# Patient Record
Sex: FEMALE | Race: ASIAN | NOT HISPANIC OR LATINO | Employment: FULL TIME | URBAN - NONMETROPOLITAN AREA
[De-identification: names, ages, dates, MRNs, and addresses within clinical notes are randomized per-mention and may not be internally consistent; named-entity substitution may affect disease eponyms.]

---

## 2017-10-20 ENCOUNTER — TRANSCRIBE ORDERS (OUTPATIENT)
Dept: ADMINISTRATIVE | Facility: HOSPITAL | Age: 27
End: 2017-10-20

## 2017-10-20 DIAGNOSIS — N80.9 ENDOMETRIOSIS: Primary | ICD-10-CM

## 2017-10-25 ENCOUNTER — HOSPITAL ENCOUNTER (OUTPATIENT)
Dept: GENERAL RADIOLOGY | Facility: HOSPITAL | Age: 27
Discharge: HOME | End: 2017-10-25
Attending: OBSTETRICS & GYNECOLOGY | Admitting: OBSTETRICS & GYNECOLOGY

## 2017-10-25 DIAGNOSIS — N80.9 ENDOMETRIOSIS: ICD-10-CM

## 2017-10-25 RX ADMIN — IOPAMIDOL 15 ML: 612 INJECTION, SOLUTION INTRATHECAL at 10:00

## 2017-11-28 LAB
EXTERNAL HEPATITIS B SURFACE ANTIGEN: NEGATIVE
EXTERNAL RUBELLA QUALITATIVE: NORMAL
EXTERNAL SYPHILIS RPR SCREEN: NORMAL
HIV1 P24 AG SERPL QL IA: NORMAL

## 2018-04-16 PROBLEM — D50.9 IRON DEFICIENCY ANEMIA: Status: ACTIVE | Noted: 2018-04-16

## 2018-04-16 RX ORDER — SODIUM CHLORIDE 9 MG/ML
250 INJECTION, SOLUTION INTRAVENOUS ONCE
Status: CANCELLED | OUTPATIENT
Start: 2018-04-20

## 2018-04-20 ENCOUNTER — INFUSION (OUTPATIENT)
Dept: ONCOLOGY | Facility: HOSPITAL | Age: 28
End: 2018-04-20

## 2018-04-20 VITALS
SYSTOLIC BLOOD PRESSURE: 107 MMHG | TEMPERATURE: 98.3 F | HEART RATE: 95 BPM | OXYGEN SATURATION: 100 % | DIASTOLIC BLOOD PRESSURE: 55 MMHG | RESPIRATION RATE: 18 BRPM | WEIGHT: 146.4 LBS

## 2018-04-20 DIAGNOSIS — D50.9 IRON DEFICIENCY ANEMIA, UNSPECIFIED IRON DEFICIENCY ANEMIA TYPE: Primary | ICD-10-CM

## 2018-04-20 PROCEDURE — 96365 THER/PROPH/DIAG IV INF INIT: CPT

## 2018-04-20 PROCEDURE — 25010000002 IRON SUCROSE PER 1 MG: Performed by: OBSTETRICS & GYNECOLOGY

## 2018-04-20 RX ORDER — SODIUM CHLORIDE 9 MG/ML
250 INJECTION, SOLUTION INTRAVENOUS ONCE
Status: COMPLETED | OUTPATIENT
Start: 2018-04-20 | End: 2018-04-20

## 2018-04-20 RX ORDER — SODIUM CHLORIDE 9 MG/ML
250 INJECTION, SOLUTION INTRAVENOUS ONCE
Status: CANCELLED | OUTPATIENT
Start: 2018-04-20

## 2018-04-20 RX ADMIN — IRON SUCROSE 200 MG: 20 INJECTION, SOLUTION INTRAVENOUS at 13:37

## 2018-04-20 RX ADMIN — SODIUM CHLORIDE 250 ML: 9 INJECTION, SOLUTION INTRAVENOUS at 13:37

## 2018-04-27 ENCOUNTER — INFUSION (OUTPATIENT)
Dept: ONCOLOGY | Facility: HOSPITAL | Age: 28
End: 2018-04-27

## 2018-04-27 VITALS
DIASTOLIC BLOOD PRESSURE: 56 MMHG | TEMPERATURE: 97.8 F | SYSTOLIC BLOOD PRESSURE: 98 MMHG | BODY MASS INDEX: 26.5 KG/M2 | HEART RATE: 92 BPM | OXYGEN SATURATION: 98 % | WEIGHT: 144 LBS | RESPIRATION RATE: 18 BRPM | HEIGHT: 62 IN

## 2018-04-27 DIAGNOSIS — D50.9 IRON DEFICIENCY ANEMIA, UNSPECIFIED IRON DEFICIENCY ANEMIA TYPE: Primary | ICD-10-CM

## 2018-04-27 PROCEDURE — G0463 HOSPITAL OUTPT CLINIC VISIT: HCPCS

## 2018-04-27 RX ORDER — DOCUSATE SODIUM 100 MG/1
100 CAPSULE, LIQUID FILLED ORAL 2 TIMES DAILY
COMMUNITY
End: 2021-12-06

## 2018-04-27 RX ORDER — FERROUS SULFATE 325(65) MG
325 TABLET ORAL
COMMUNITY
End: 2021-03-15

## 2018-04-27 RX ORDER — SODIUM CHLORIDE 9 MG/ML
250 INJECTION, SOLUTION INTRAVENOUS ONCE
Status: CANCELLED | OUTPATIENT
Start: 2018-04-27

## 2018-04-27 RX ORDER — SODIUM CHLORIDE 9 MG/ML
250 INJECTION, SOLUTION INTRAVENOUS ONCE
Status: COMPLETED | OUTPATIENT
Start: 2018-04-27 | End: 2018-04-27

## 2018-04-27 RX ADMIN — SODIUM CHLORIDE 250 ML: 9 INJECTION, SOLUTION INTRAVENOUS at 14:55

## 2018-04-27 NOTE — PROGRESS NOTES
0880 Patient c/o having a headache after last weeks treatment but had the headache before the treatment. Also, felt like ran a temp about 2-2 1/2 hours after treatment last week, did not check temperature, chilled for a while then felt fine. B/P 97/54 on machine and 98/56 manually. Feels fine, denies problems at present. Called and s/w Dr. Cyndie Daley's nurse. Will discuss with her and call back with further orders.Jenna DELGADO    5041 Thuy DELGADO with Dr. Age[facility] to call pt with appointment next week. Patient aware, voiced understanding.Jenna DELGADO

## 2018-05-11 ENCOUNTER — INFUSION (OUTPATIENT)
Dept: ONCOLOGY | Facility: HOSPITAL | Age: 28
End: 2018-05-11

## 2018-05-11 VITALS
BODY MASS INDEX: 27.86 KG/M2 | TEMPERATURE: 98 F | WEIGHT: 151.4 LBS | HEART RATE: 99 BPM | SYSTOLIC BLOOD PRESSURE: 99 MMHG | DIASTOLIC BLOOD PRESSURE: 56 MMHG | OXYGEN SATURATION: 100 % | HEIGHT: 62 IN | RESPIRATION RATE: 18 BRPM

## 2018-05-11 DIAGNOSIS — D50.9 IRON DEFICIENCY ANEMIA, UNSPECIFIED IRON DEFICIENCY ANEMIA TYPE: Primary | ICD-10-CM

## 2018-05-11 PROCEDURE — 25010000002 IRON SUCROSE PER 1 MG: Performed by: OBSTETRICS & GYNECOLOGY

## 2018-05-11 PROCEDURE — 96365 THER/PROPH/DIAG IV INF INIT: CPT

## 2018-05-11 RX ORDER — SODIUM CHLORIDE 9 MG/ML
250 INJECTION, SOLUTION INTRAVENOUS ONCE
Status: COMPLETED | OUTPATIENT
Start: 2018-05-11 | End: 2018-05-11

## 2018-05-11 RX ORDER — SODIUM CHLORIDE 9 MG/ML
250 INJECTION, SOLUTION INTRAVENOUS ONCE
Status: CANCELLED | OUTPATIENT
Start: 2018-05-11

## 2018-05-11 RX ADMIN — IRON SUCROSE 200 MG: 20 INJECTION, SOLUTION INTRAVENOUS at 14:20

## 2018-05-11 RX ADMIN — SODIUM CHLORIDE 250 ML: 9 INJECTION, SOLUTION INTRAVENOUS at 14:20

## 2018-05-18 ENCOUNTER — APPOINTMENT (OUTPATIENT)
Dept: ONCOLOGY | Facility: HOSPITAL | Age: 28
End: 2018-05-18

## 2018-05-22 ENCOUNTER — INFUSION (OUTPATIENT)
Dept: ONCOLOGY | Facility: HOSPITAL | Age: 28
End: 2018-05-22

## 2018-05-22 ENCOUNTER — APPOINTMENT (OUTPATIENT)
Dept: ONCOLOGY | Facility: HOSPITAL | Age: 28
End: 2018-05-22

## 2018-05-22 VITALS
RESPIRATION RATE: 20 BRPM | DIASTOLIC BLOOD PRESSURE: 57 MMHG | TEMPERATURE: 98.2 F | HEART RATE: 89 BPM | OXYGEN SATURATION: 99 % | HEIGHT: 62 IN | WEIGHT: 151 LBS | BODY MASS INDEX: 27.79 KG/M2 | SYSTOLIC BLOOD PRESSURE: 97 MMHG

## 2018-05-22 DIAGNOSIS — D50.9 IRON DEFICIENCY ANEMIA, UNSPECIFIED IRON DEFICIENCY ANEMIA TYPE: Primary | ICD-10-CM

## 2018-05-22 PROCEDURE — 96365 THER/PROPH/DIAG IV INF INIT: CPT

## 2018-05-22 PROCEDURE — 25010000002 IRON SUCROSE PER 1 MG: Performed by: OBSTETRICS & GYNECOLOGY

## 2018-05-22 RX ORDER — SODIUM CHLORIDE 9 MG/ML
250 INJECTION, SOLUTION INTRAVENOUS ONCE
Status: CANCELLED | OUTPATIENT
Start: 2018-05-22

## 2018-05-22 RX ORDER — SODIUM CHLORIDE 9 MG/ML
250 INJECTION, SOLUTION INTRAVENOUS ONCE
Status: COMPLETED | OUTPATIENT
Start: 2018-05-22 | End: 2018-05-22

## 2018-05-22 RX ADMIN — SODIUM CHLORIDE 250 ML: 9 INJECTION, SOLUTION INTRAVENOUS at 14:52

## 2018-05-22 RX ADMIN — IRON SUCROSE 200 MG: 20 INJECTION, SOLUTION INTRAVENOUS at 14:52

## 2018-05-31 ENCOUNTER — INFUSION (OUTPATIENT)
Dept: ONCOLOGY | Facility: HOSPITAL | Age: 28
End: 2018-05-31

## 2018-05-31 VITALS
WEIGHT: 156.6 LBS | HEIGHT: 62 IN | BODY MASS INDEX: 28.82 KG/M2 | DIASTOLIC BLOOD PRESSURE: 56 MMHG | TEMPERATURE: 97.8 F | RESPIRATION RATE: 20 BRPM | OXYGEN SATURATION: 100 % | SYSTOLIC BLOOD PRESSURE: 95 MMHG | HEART RATE: 84 BPM

## 2018-05-31 DIAGNOSIS — D50.9 IRON DEFICIENCY ANEMIA, UNSPECIFIED IRON DEFICIENCY ANEMIA TYPE: Primary | ICD-10-CM

## 2018-05-31 PROCEDURE — 25010000002 IRON SUCROSE PER 1 MG: Performed by: OBSTETRICS & GYNECOLOGY

## 2018-05-31 PROCEDURE — 96365 THER/PROPH/DIAG IV INF INIT: CPT

## 2018-05-31 RX ORDER — SODIUM CHLORIDE 9 MG/ML
250 INJECTION, SOLUTION INTRAVENOUS ONCE
Status: CANCELLED | OUTPATIENT
Start: 2018-05-31

## 2018-05-31 RX ORDER — SODIUM CHLORIDE 9 MG/ML
250 INJECTION, SOLUTION INTRAVENOUS ONCE
Status: COMPLETED | OUTPATIENT
Start: 2018-05-31 | End: 2018-05-31

## 2018-05-31 RX ADMIN — IRON SUCROSE 200 MG: 20 INJECTION, SOLUTION INTRAVENOUS at 15:13

## 2018-05-31 RX ADMIN — SODIUM CHLORIDE 250 ML: 9 INJECTION, SOLUTION INTRAVENOUS at 15:12

## 2018-07-03 LAB — EXTERNAL GROUP B STREP ANTIGEN: NEGATIVE

## 2018-07-31 ENCOUNTER — HOSPITAL ENCOUNTER (INPATIENT)
Facility: HOSPITAL | Age: 28
LOS: 2 days | Discharge: HOME | End: 2018-08-02
Attending: OBSTETRICS & GYNECOLOGY | Admitting: OBSTETRICS & GYNECOLOGY

## 2018-07-31 ENCOUNTER — ANESTHESIA EVENT (OUTPATIENT)
Dept: LABOR AND DELIVERY | Facility: HOSPITAL | Age: 28
End: 2018-07-31

## 2018-07-31 ENCOUNTER — ANESTHESIA (OUTPATIENT)
Dept: LABOR AND DELIVERY | Facility: HOSPITAL | Age: 28
End: 2018-07-31

## 2018-07-31 DIAGNOSIS — D50.9 IRON DEFICIENCY ANEMIA, UNSPECIFIED IRON DEFICIENCY ANEMIA TYPE: Primary | ICD-10-CM

## 2018-07-31 DIAGNOSIS — Z37.9 NORMAL LABOR: ICD-10-CM

## 2018-07-31 DIAGNOSIS — Z34.93 THIRD TRIMESTER PREGNANCY: ICD-10-CM

## 2018-07-31 LAB
ABO GROUP BLD: NORMAL
BLD GP AB SCN SERPL QL: NEGATIVE
DEPRECATED RDW RBC AUTO: 46.5 FL (ref 40–54)
ERYTHROCYTE [DISTWIDTH] IN BLOOD BY AUTOMATED COUNT: 14.6 % (ref 12–15)
GIANT PLATELETS: ABNORMAL
HCT VFR BLD AUTO: 37.2 % (ref 37–47)
HGB BLD-MCNC: 12.3 G/DL (ref 12–16)
LYMPHOCYTES # BLD MANUAL: 1.64 10*3/MM3 (ref 0.72–4.86)
LYMPHOCYTES NFR BLD MANUAL: 13.3 % (ref 15–45)
LYMPHOCYTES NFR BLD MANUAL: 7.1 % (ref 4–12)
MCH RBC QN AUTO: 29.3 PG (ref 28–32)
MCHC RBC AUTO-ENTMCNC: 33.1 G/DL (ref 33–36)
MCV RBC AUTO: 88.6 FL (ref 82–98)
MONOCYTES # BLD AUTO: 0.88 10*3/MM3 (ref 0.19–1.3)
NEUTROPHILS # BLD AUTO: 9.44 10*3/MM3 (ref 1.87–8.4)
NEUTROPHILS NFR BLD MANUAL: 76.5 % (ref 39–78)
PLATELET # BLD AUTO: 185 10*3/MM3 (ref 130–400)
PMV BLD AUTO: 11.2 FL (ref 6–12)
RBC # BLD AUTO: 4.2 10*6/MM3 (ref 4.2–5.4)
RBC MORPH BLD: NORMAL
RH BLD: POSITIVE
T&S EXPIRATION DATE: NORMAL
VARIANT LYMPHS NFR BLD MANUAL: 3.1 % (ref 0–5)
WBC MORPH BLD: NORMAL
WBC NRBC COR # BLD: 12.34 10*3/MM3 (ref 4.8–10.8)

## 2018-07-31 PROCEDURE — 86901 BLOOD TYPING SEROLOGIC RH(D): CPT | Performed by: OBSTETRICS & GYNECOLOGY

## 2018-07-31 PROCEDURE — 86850 RBC ANTIBODY SCREEN: CPT | Performed by: OBSTETRICS & GYNECOLOGY

## 2018-07-31 PROCEDURE — 25010000002 BUTORPHANOL PER 1 MG: Performed by: OBSTETRICS & GYNECOLOGY

## 2018-07-31 PROCEDURE — 4A1HX4Z MONITORING OF PRODUCTS OF CONCEPTION, CARDIAC ELECTRICAL ACTIVITY, EXTERNAL APPROACH: ICD-10-PCS | Performed by: OBSTETRICS & GYNECOLOGY

## 2018-07-31 PROCEDURE — 25010000002 FENTANYL CITRATE (PF) 250 MCG/5ML SOLUTION: Performed by: NURSE ANESTHETIST, CERTIFIED REGISTERED

## 2018-07-31 PROCEDURE — 85007 BL SMEAR W/DIFF WBC COUNT: CPT | Performed by: OBSTETRICS & GYNECOLOGY

## 2018-07-31 PROCEDURE — C1755 CATHETER, INTRASPINAL: HCPCS | Performed by: NURSE ANESTHETIST, CERTIFIED REGISTERED

## 2018-07-31 PROCEDURE — 25010000002 KETOROLAC TROMETHAMINE PER 15 MG: Performed by: OBSTETRICS & GYNECOLOGY

## 2018-07-31 PROCEDURE — 85027 COMPLETE CBC AUTOMATED: CPT | Performed by: OBSTETRICS & GYNECOLOGY

## 2018-07-31 PROCEDURE — 0DQP0ZZ REPAIR RECTUM, OPEN APPROACH: ICD-10-PCS | Performed by: OBSTETRICS & GYNECOLOGY

## 2018-07-31 PROCEDURE — 86900 BLOOD TYPING SEROLOGIC ABO: CPT | Performed by: OBSTETRICS & GYNECOLOGY

## 2018-07-31 PROCEDURE — 25010000002 ROPIVACAINE PER 1 MG: Performed by: NURSE ANESTHETIST, CERTIFIED REGISTERED

## 2018-07-31 PROCEDURE — 51702 INSERT TEMP BLADDER CATH: CPT

## 2018-07-31 RX ORDER — DOCUSATE SODIUM 100 MG/1
100 CAPSULE, LIQUID FILLED ORAL 3 TIMES DAILY
Status: DISCONTINUED | OUTPATIENT
Start: 2018-07-31 | End: 2018-08-02 | Stop reason: HOSPADM

## 2018-07-31 RX ORDER — METHYLERGONOVINE MALEATE 0.2 MG/ML
200 INJECTION INTRAVENOUS ONCE
Status: DISCONTINUED | OUTPATIENT
Start: 2018-07-31 | End: 2018-08-01

## 2018-07-31 RX ORDER — ONDANSETRON 4 MG/1
4 TABLET, ORALLY DISINTEGRATING ORAL EVERY 6 HOURS PRN
Status: DISCONTINUED | OUTPATIENT
Start: 2018-07-31 | End: 2018-07-31

## 2018-07-31 RX ORDER — FENTANYL CITRATE 50 UG/ML
INJECTION, SOLUTION INTRAMUSCULAR; INTRAVENOUS AS NEEDED
Status: DISCONTINUED | OUTPATIENT
Start: 2018-07-31 | End: 2018-08-01 | Stop reason: SURG

## 2018-07-31 RX ORDER — LIDOCAINE HYDROCHLORIDE AND EPINEPHRINE 15; 5 MG/ML; UG/ML
INJECTION, SOLUTION EPIDURAL AS NEEDED
Status: DISCONTINUED | OUTPATIENT
Start: 2018-07-31 | End: 2018-08-01 | Stop reason: SURG

## 2018-07-31 RX ORDER — LIDOCAINE HYDROCHLORIDE 10 MG/ML
5 INJECTION, SOLUTION EPIDURAL; INFILTRATION; INTRACAUDAL; PERINEURAL AS NEEDED
Status: DISCONTINUED | OUTPATIENT
Start: 2018-07-31 | End: 2018-08-02 | Stop reason: HOSPADM

## 2018-07-31 RX ORDER — MISOPROSTOL 200 UG/1
800 TABLET ORAL AS NEEDED
Status: DISCONTINUED | OUTPATIENT
Start: 2018-07-31 | End: 2018-07-31

## 2018-07-31 RX ORDER — ONDANSETRON 2 MG/ML
4 INJECTION INTRAMUSCULAR; INTRAVENOUS EVERY 6 HOURS PRN
Status: DISCONTINUED | OUTPATIENT
Start: 2018-07-31 | End: 2018-08-02 | Stop reason: HOSPADM

## 2018-07-31 RX ORDER — OXYTOCIN/0.9 % SODIUM CHLORIDE 30/500 ML
2-30 PLASTIC BAG, INJECTION (ML) INTRAVENOUS
Status: DISCONTINUED | OUTPATIENT
Start: 2018-07-31 | End: 2018-07-31

## 2018-07-31 RX ORDER — BUTORPHANOL TARTRATE 1 MG/ML
1 INJECTION, SOLUTION INTRAMUSCULAR; INTRAVENOUS ONCE AS NEEDED
Status: COMPLETED | OUTPATIENT
Start: 2018-07-31 | End: 2018-07-31

## 2018-07-31 RX ORDER — CARBOPROST TROMETHAMINE 250 UG/ML
250 INJECTION, SOLUTION INTRAMUSCULAR AS NEEDED
Status: DISCONTINUED | OUTPATIENT
Start: 2018-07-31 | End: 2018-07-31 | Stop reason: HOSPADM

## 2018-07-31 RX ORDER — METHYLERGONOVINE MALEATE 0.2 MG/ML
200 INJECTION INTRAVENOUS ONCE AS NEEDED
Status: DISCONTINUED | OUTPATIENT
Start: 2018-07-31 | End: 2018-07-31 | Stop reason: HOSPADM

## 2018-07-31 RX ORDER — ROPIVACAINE HYDROCHLORIDE 5 MG/ML
INJECTION, SOLUTION EPIDURAL; INFILTRATION; PERINEURAL AS NEEDED
Status: DISCONTINUED | OUTPATIENT
Start: 2018-07-31 | End: 2018-08-01 | Stop reason: SURG

## 2018-07-31 RX ORDER — ONDANSETRON 4 MG/1
4 TABLET, FILM COATED ORAL EVERY 6 HOURS PRN
Status: DISCONTINUED | OUTPATIENT
Start: 2018-07-31 | End: 2018-07-31

## 2018-07-31 RX ORDER — ROPIVACAINE HYDROCHLORIDE 2 MG/ML
INJECTION, SOLUTION EPIDURAL; INFILTRATION; PERINEURAL AS NEEDED
Status: DISCONTINUED | OUTPATIENT
Start: 2018-07-31 | End: 2018-08-01 | Stop reason: SURG

## 2018-07-31 RX ORDER — LIDOCAINE HYDROCHLORIDE 15 MG/ML
INJECTION, SOLUTION EPIDURAL; INFILTRATION; INTRACAUDAL; PERINEURAL AS NEEDED
Status: DISCONTINUED | OUTPATIENT
Start: 2018-07-31 | End: 2018-08-01 | Stop reason: SURG

## 2018-07-31 RX ORDER — OXYTOCIN/RINGER'S LACTATE 20/1000 ML
999 PLASTIC BAG, INJECTION (ML) INTRAVENOUS ONCE
Status: DISCONTINUED | OUTPATIENT
Start: 2018-07-31 | End: 2018-07-31 | Stop reason: HOSPADM

## 2018-07-31 RX ORDER — ACETAMINOPHEN 325 MG/1
650 TABLET ORAL EVERY 4 HOURS PRN
Status: DISCONTINUED | OUTPATIENT
Start: 2018-07-31 | End: 2018-08-02 | Stop reason: HOSPADM

## 2018-07-31 RX ORDER — HETASTARCH 6 G/100ML
INJECTION, SOLUTION INTRAVENOUS
Status: COMPLETED
Start: 2018-07-31 | End: 2018-07-31

## 2018-07-31 RX ORDER — ONDANSETRON 2 MG/ML
4 INJECTION INTRAMUSCULAR; INTRAVENOUS EVERY 6 HOURS PRN
Status: DISCONTINUED | OUTPATIENT
Start: 2018-07-31 | End: 2018-07-31

## 2018-07-31 RX ORDER — SODIUM CHLORIDE 0.9 % (FLUSH) 0.9 %
1-10 SYRINGE (ML) INJECTION AS NEEDED
Status: DISCONTINUED | OUTPATIENT
Start: 2018-07-31 | End: 2018-08-02 | Stop reason: HOSPADM

## 2018-07-31 RX ORDER — SODIUM CHLORIDE, SODIUM LACTATE, POTASSIUM CHLORIDE, CALCIUM CHLORIDE 600; 310; 30; 20 MG/100ML; MG/100ML; MG/100ML; MG/100ML
999 INJECTION, SOLUTION INTRAVENOUS ONCE
Status: COMPLETED | OUTPATIENT
Start: 2018-07-31 | End: 2018-07-31

## 2018-07-31 RX ORDER — ONDANSETRON 4 MG/1
4 TABLET, FILM COATED ORAL EVERY 8 HOURS PRN
Status: DISCONTINUED | OUTPATIENT
Start: 2018-07-31 | End: 2018-08-02 | Stop reason: HOSPADM

## 2018-07-31 RX ORDER — KETOROLAC TROMETHAMINE 30 MG/ML
30 INJECTION, SOLUTION INTRAMUSCULAR; INTRAVENOUS ONCE
Status: COMPLETED | OUTPATIENT
Start: 2018-07-31 | End: 2018-07-31

## 2018-07-31 RX ORDER — OXYCODONE HYDROCHLORIDE AND ACETAMINOPHEN 5; 325 MG/1; MG/1
1 TABLET ORAL EVERY 4 HOURS PRN
Status: DISCONTINUED | OUTPATIENT
Start: 2018-07-31 | End: 2018-08-02 | Stop reason: HOSPADM

## 2018-07-31 RX ORDER — DEXTROSE, SODIUM CHLORIDE, SODIUM LACTATE, POTASSIUM CHLORIDE, AND CALCIUM CHLORIDE 5; .6; .31; .03; .02 G/100ML; G/100ML; G/100ML; G/100ML; G/100ML
999 INJECTION, SOLUTION INTRAVENOUS ONCE
Status: COMPLETED | OUTPATIENT
Start: 2018-07-31 | End: 2018-07-31

## 2018-07-31 RX ORDER — OXYTOCIN/RINGER'S LACTATE 20/1000 ML
125 PLASTIC BAG, INJECTION (ML) INTRAVENOUS AS NEEDED
Status: DISCONTINUED | OUTPATIENT
Start: 2018-07-31 | End: 2018-07-31 | Stop reason: HOSPADM

## 2018-07-31 RX ORDER — FERROUS SULFATE 325(65) MG
325 TABLET ORAL 2 TIMES DAILY WITH MEALS
Status: DISCONTINUED | OUTPATIENT
Start: 2018-07-31 | End: 2018-08-02 | Stop reason: HOSPADM

## 2018-07-31 RX ORDER — SODIUM CHLORIDE 0.9 % (FLUSH) 0.9 %
1-10 SYRINGE (ML) INJECTION AS NEEDED
Status: DISCONTINUED | OUTPATIENT
Start: 2018-07-31 | End: 2018-08-01

## 2018-07-31 RX ORDER — CARBOPROST TROMETHAMINE 250 UG/ML
250 INJECTION, SOLUTION INTRAMUSCULAR ONCE
Status: DISCONTINUED | OUTPATIENT
Start: 2018-07-31 | End: 2018-08-01

## 2018-07-31 RX ORDER — BUTORPHANOL TARTRATE 1 MG/ML
1 INJECTION, SOLUTION INTRAMUSCULAR; INTRAVENOUS
Status: DISCONTINUED | OUTPATIENT
Start: 2018-07-31 | End: 2018-07-31

## 2018-07-31 RX ORDER — SODIUM CHLORIDE, SODIUM LACTATE, POTASSIUM CHLORIDE, CALCIUM CHLORIDE 600; 310; 30; 20 MG/100ML; MG/100ML; MG/100ML; MG/100ML
125 INJECTION, SOLUTION INTRAVENOUS CONTINUOUS
Status: DISCONTINUED | OUTPATIENT
Start: 2018-07-31 | End: 2018-07-31

## 2018-07-31 RX ORDER — OXYCODONE AND ACETAMINOPHEN 10; 325 MG/1; MG/1
1 TABLET ORAL EVERY 4 HOURS PRN
Status: DISCONTINUED | OUTPATIENT
Start: 2018-07-31 | End: 2018-08-01

## 2018-07-31 RX ORDER — CALCIUM CARBONATE 200(500)MG
2 TABLET,CHEWABLE ORAL 3 TIMES DAILY PRN
Status: DISCONTINUED | OUTPATIENT
Start: 2018-07-31 | End: 2018-08-02 | Stop reason: HOSPADM

## 2018-07-31 RX ORDER — OXYTOCIN/RINGER'S LACTATE 20/1000 ML
999 PLASTIC BAG, INJECTION (ML) INTRAVENOUS CONTINUOUS
Status: DISCONTINUED | OUTPATIENT
Start: 2018-07-31 | End: 2018-08-01

## 2018-07-31 RX ORDER — HETASTARCH 6 G/100ML
500 INJECTION, SOLUTION INTRAVENOUS CONTINUOUS
Status: DISCONTINUED | OUTPATIENT
Start: 2018-07-31 | End: 2018-07-31

## 2018-07-31 RX ORDER — EPHEDRINE SULFATE 50 MG/ML
5 INJECTION, SOLUTION INTRAVENOUS
Status: DISCONTINUED | OUTPATIENT
Start: 2018-07-31 | End: 2018-07-31

## 2018-07-31 RX ADMIN — KETOROLAC TROMETHAMINE 30 MG: 30 INJECTION, SOLUTION INTRAMUSCULAR at 18:37

## 2018-07-31 RX ADMIN — SODIUM CHLORIDE, POTASSIUM CHLORIDE, SODIUM LACTATE AND CALCIUM CHLORIDE 125 ML/HR: 600; 310; 30; 20 INJECTION, SOLUTION INTRAVENOUS at 16:12

## 2018-07-31 RX ADMIN — ROPIVACAINE HYDROCHLORIDE 6 ML: 2 INJECTION, SOLUTION EPIDURAL; INFILTRATION at 12:52

## 2018-07-31 RX ADMIN — LIDOCAINE HYDROCHLORIDE 3 ML: 15 INJECTION, SOLUTION EPIDURAL; INFILTRATION; INTRACAUDAL; PERINEURAL at 12:50

## 2018-07-31 RX ADMIN — SODIUM CHLORIDE, POTASSIUM CHLORIDE, SODIUM LACTATE AND CALCIUM CHLORIDE 1000 ML: 600; 310; 30; 20 INJECTION, SOLUTION INTRAVENOUS at 07:31

## 2018-07-31 RX ADMIN — LIDOCAINE HYDROCHLORIDE AND EPINEPHRINE 3 ML: 15; 5 INJECTION, SOLUTION EPIDURAL at 12:53

## 2018-07-31 RX ADMIN — FENTANYL CITRATE 50 MCG: 50 INJECTION INTRAMUSCULAR; INTRAVENOUS at 12:52

## 2018-07-31 RX ADMIN — SODIUM CHLORIDE, SODIUM LACTATE, POTASSIUM CHLORIDE, CALCIUM CHLORIDE AND DEXTROSE MONOHYDRATE 999 ML/HR: 5; 600; 310; 30; 20 INJECTION, SOLUTION INTRAVENOUS at 15:37

## 2018-07-31 RX ADMIN — ROPIVACAINE HYDROCHLORIDE 6 ML: 5 INJECTION, SOLUTION EPIDURAL; INFILTRATION; PERINEURAL at 12:52

## 2018-07-31 RX ADMIN — FENTANYL CITRATE 200 MCG: 50 INJECTION INTRAMUSCULAR; INTRAVENOUS at 12:57

## 2018-07-31 RX ADMIN — OXYTOCIN 2 MILLI-UNITS/MIN: 10 INJECTION INTRAVENOUS at 11:14

## 2018-07-31 RX ADMIN — SODIUM CHLORIDE, POTASSIUM CHLORIDE, SODIUM LACTATE AND CALCIUM CHLORIDE 999 ML/HR: 600; 310; 30; 20 INJECTION, SOLUTION INTRAVENOUS at 21:50

## 2018-07-31 RX ADMIN — HETASTARCH 500 ML: 6 INJECTION, SOLUTION INTRAVENOUS at 08:09

## 2018-07-31 RX ADMIN — SODIUM CHLORIDE, POTASSIUM CHLORIDE, SODIUM LACTATE AND CALCIUM CHLORIDE 125 ML/HR: 600; 310; 30; 20 INJECTION, SOLUTION INTRAVENOUS at 11:16

## 2018-07-31 RX ADMIN — SODIUM CHLORIDE, POTASSIUM CHLORIDE, SODIUM LACTATE AND CALCIUM CHLORIDE 125 ML/HR: 600; 310; 30; 20 INJECTION, SOLUTION INTRAVENOUS at 06:29

## 2018-07-31 RX ADMIN — SODIUM CHLORIDE, POTASSIUM CHLORIDE, SODIUM LACTATE AND CALCIUM CHLORIDE 125 ML/HR: 600; 310; 30; 20 INJECTION, SOLUTION INTRAVENOUS at 15:09

## 2018-07-31 RX ADMIN — FERROUS SULFATE TAB 325 MG (65 MG ELEMENTAL FE) 325 MG: 325 (65 FE) TAB at 22:26

## 2018-07-31 RX ADMIN — ACETAMINOPHEN 650 MG: 325 TABLET, FILM COATED ORAL at 23:17

## 2018-07-31 RX ADMIN — ROPIVACAINE HYDROCHLORIDE 10 ML/HR: 2 INJECTION, SOLUTION EPIDURAL; INFILTRATION at 13:10

## 2018-07-31 RX ADMIN — BUTORPHANOL TARTRATE 1 MG: 1 INJECTION, SOLUTION INTRAMUSCULAR; INTRAVENOUS at 11:45

## 2018-08-01 LAB
HCT VFR BLD AUTO: 25.6 % (ref 37–47)
HGB BLD-MCNC: 8.5 G/DL (ref 12–16)

## 2018-08-01 PROCEDURE — 25010000002 IRON SUCROSE PER 1 MG: Performed by: OBSTETRICS & GYNECOLOGY

## 2018-08-01 PROCEDURE — 85014 HEMATOCRIT: CPT | Performed by: OBSTETRICS & GYNECOLOGY

## 2018-08-01 PROCEDURE — 85018 HEMOGLOBIN: CPT | Performed by: OBSTETRICS & GYNECOLOGY

## 2018-08-01 PROCEDURE — 25010000003 CEFAZOLIN PER 500 MG: Performed by: OBSTETRICS & GYNECOLOGY

## 2018-08-01 RX ORDER — IBUPROFEN 200 MG
600 TABLET ORAL EVERY 4 HOURS PRN
Status: DISCONTINUED | OUTPATIENT
Start: 2018-08-01 | End: 2018-08-02 | Stop reason: HOSPADM

## 2018-08-01 RX ADMIN — POLYETHYLENE GLYCOL (3350) 17 G: 17 POWDER, FOR SOLUTION ORAL at 07:56

## 2018-08-01 RX ADMIN — IBUPROFEN 600 MG: 200 TABLET, FILM COATED ORAL at 18:45

## 2018-08-01 RX ADMIN — FERROUS SULFATE TAB 325 MG (65 MG ELEMENTAL FE) 325 MG: 325 (65 FE) TAB at 07:56

## 2018-08-01 RX ADMIN — OXYCODONE HYDROCHLORIDE AND ACETAMINOPHEN 1 TABLET: 5; 325 TABLET ORAL at 18:03

## 2018-08-01 RX ADMIN — OXYCODONE HYDROCHLORIDE AND ACETAMINOPHEN 1 TABLET: 5; 325 TABLET ORAL at 09:20

## 2018-08-01 RX ADMIN — CEFAZOLIN 2 G: 330 INJECTION, POWDER, FOR SOLUTION INTRAMUSCULAR; INTRAVENOUS at 20:45

## 2018-08-01 RX ADMIN — DOCUSATE SODIUM 100 MG: 100 CAPSULE, LIQUID FILLED ORAL at 07:56

## 2018-08-01 RX ADMIN — IRON SUCROSE 200 MG: 20 INJECTION, SOLUTION INTRAVENOUS at 10:16

## 2018-08-01 NOTE — ANESTHESIA POSTPROCEDURE EVALUATION
"Patient: Yajaira Doss    Procedure Summary     Date:  07/31/18 Room / Location:      Anesthesia Start:  1247 Anesthesia Stop:  1530    Procedure:  LABOR ANALGESIA Diagnosis:      Scheduled Providers:   Provider:  Murphy Duff CRNA    Anesthesia Type:  epidural ASA Status:  1          Anesthesia Type: epidural  Last vitals  BP   96/60 (08/01/18 1201)   Temp   97.6 °F (36.4 °C) (08/01/18 1201)   Pulse   86 (08/01/18 1201)   Resp   16 (08/01/18 1201)     SpO2   97 % (08/01/18 1201)     Post Anesthesia Care and Evaluation    Patient location during evaluation: floor  Patient participation: complete - patient participated  Level of consciousness: awake  Pain score: 0  Pain management: adequate  Airway patency: patent  Anesthetic complications: No anesthetic complications  PONV Status: none  Cardiovascular status: acceptable  Respiratory status: acceptable  Hydration status: acceptable  Post Neuraxial Block status: Motor and sensory function returned to baseline  Comments: Pt complains of \"slight\" headache when getting up to bathroom or ambulating  Laying in bed with head 45 degrees now with no head ache  Explained treatment and options for spinal headache  She stated she will let things be for now  Instructed to call anesthesia if symptoms change  No anesthesia care post op    "

## 2018-08-01 NOTE — PLAN OF CARE
Problem: Patient Care Overview  Goal: Plan of Care Review  Outcome: Ongoing (interventions implemented as appropriate)   08/01/18 1701   Coping/Psychosocial   Plan of Care Reviewed With patient   Plan of Care Review   Progress improving   OTHER   Outcome Summary VSS, sitz bath done per pt, FF ML UU scant lochia, percocet given for pain x1 today. Pt ambulating without assistance.      Goal: Individualization and Mutuality  Outcome: Ongoing (interventions implemented as appropriate)    Goal: Discharge Needs Assessment  Outcome: Ongoing (interventions implemented as appropriate)    Goal: Interprofessional Rounds/Family Conf  Outcome: Ongoing (interventions implemented as appropriate)      Problem: Anesthesia/Analgesia, Neuraxial (Obstetrics)  Goal: Signs and Symptoms of Listed Potential Problems Will be Absent, Minimized or Managed (Anesthesia/Analgesia, Neuraxial)  Outcome: Outcome(s) achieved Date Met: 08/01/18      Problem: Postpartum (Vaginal Delivery) (Adult,Obstetrics,Pediatric)  Goal: Signs and Symptoms of Listed Potential Problems Will be Absent, Minimized or Managed (Postpartum)  Outcome: Ongoing (interventions implemented as appropriate)

## 2018-08-01 NOTE — PLAN OF CARE
Problem: Patient Care Overview  Goal: Plan of Care Review   07/31/18 2039   Coping/Psychosocial   Plan of Care Reviewed With patient;spouse     Goal: Individualization and Mutuality  Outcome: Ongoing (interventions implemented as appropriate)

## 2018-08-01 NOTE — PLAN OF CARE
Problem: Patient Care Overview  Goal: Plan of Care Review  Outcome: Ongoing (interventions implemented as appropriate)   08/01/18 0547   Coping/Psychosocial   Plan of Care Reviewed With patient   Plan of Care Review   Progress improving   OTHER   Outcome Summary VSS. Comfort products used for 4th degree laceration. FF ML UU scant lochia. Tylenol given for pain per pt request. Sitz bath given. Voiding.      Goal: Individualization and Mutuality  Outcome: Ongoing (interventions implemented as appropriate)    Goal: Discharge Needs Assessment  Outcome: Ongoing (interventions implemented as appropriate)    Goal: Interprofessional Rounds/Family Conf  Outcome: Ongoing (interventions implemented as appropriate)      Problem: Anesthesia/Analgesia, Neuraxial (Obstetrics)  Goal: Signs and Symptoms of Listed Potential Problems Will be Absent, Minimized or Managed (Anesthesia/Analgesia, Neuraxial)  Outcome: Ongoing (interventions implemented as appropriate)      Problem: Postpartum (Vaginal Delivery) (Adult,Obstetrics,Pediatric)  Goal: Signs and Symptoms of Listed Potential Problems Will be Absent, Minimized or Managed (Postpartum)  Outcome: Ongoing (interventions implemented as appropriate)

## 2018-08-01 NOTE — LACTATION NOTE
40/3 week female infant delivered vaginally 18 at 1723. Birth weight 6-7.7 (2940g). 5 stools and 5 breastfeeding sessions noted per charting since delivery. Infant is due to void. Mother reports infant  well after delivery. She states baby has been sleepy through the night, not wanting to wake to breastfeed. Mother pumped once, no drops collected. Assisted with waking techniques, proper positioning, and latching. Mother's nipple inverted with compression. Infant tongue thrusting and gagging, unable to latch despite attempts. Latch attempted with small nipple shield, however, infant continued to tongue thrust and gag. Unable to express drops of colostrum. Mother requests to feed formula at this time. Infant struggled with bottle feeding as well. Infant finally took about 10 ml of formula with MADALYN bottle. Mother pumped for 15 minutes, no drops collected. Education provided regarding pumping, flange size/proper fit, nipple shield, milk supply, formula feeding, adequate feedings, skin to skin, storage of breastmilk, and breast massage/hand expression. Gave and reviewed initial breastfeeding packet and book. Feeding plan discussed. Lactation's number placed on white board. Mother verbalized understanding. Questions denied.     Maternal Hx: , Anemia  Medications: PNV, FE, Vit B-12, Colace  Pump: Medela

## 2018-08-01 NOTE — NURSING NOTE
Notified of blood pressure drop of 8160 while patient was up to restroom.   In/out cath done with 600 ml of urine returned.  Fundal check is 1U,  scant rubra, uterus is firm without massage.  Patient feeling better and bp is 109/67.   Patient may transfer to2A at 2100 if blood pressure is still stable.  Crissy Simms RN

## 2018-08-01 NOTE — PROGRESS NOTES
"[facility]  Vaginal Delivery Progress Note    Subjective   Postpartum Day 1: Vaginal Delivery    The patient feels well.  Her pain is well controlled with nonsteroidal anti-inflammatory drugs.   She is ambulating well.  Patient describes her bleeding as thin lochia.        Objective     Vital Signs Range for the last 24 hours  Temperature: Temp:  [98.1 °F (36.7 °C)-98.8 °F (37.1 °C)] 98.5 °F (36.9 °C)   Temp Source: Temp src: Temporal Artery    BP: BP: ()/(44-95) 109/55   Pulse: Heart Rate:  [] 93   Respirations: Resp:  [14-18] 14   SPO2: SpO2:  [97 %-100 %] 98 %   O2 Amount (l/min):     O2 Devices Device (Oxygen Therapy): room air   Weight:       Admit Height:  Height: 157.5 cm (62\")      Physical Exam:  General:  no acute distresss.  Abdomen: abdomen is soft without significant tenderness, masses, organomegaly or guarding. Fundus: appropriate, firm, non tender  Extremities: normal, atraumatic, no cyanosis, and trace edema.   Perineum swollen, stitches intact    Lab results reviewed:  Yes     External Rubella Qual   Date Value Ref Range Status   2017 Immune  Final     Rh Status:    RH type   Date Value Ref Range Status   2018 Positive  Final     Assessment:  1.  PPD#1  fourth degree laceration  2.  Chronic anemia  3.  Large uterine fibroid    Plan:  1.  Supportive care ice/sitz bath/stool softener for laceration  2.  Chronic anemia- venofer/oral iron supplement  3.  Routine post partum care      Catie Costa DO  2018  8:38 AM  "

## 2018-08-02 VITALS
HEIGHT: 62 IN | BODY MASS INDEX: 30.66 KG/M2 | HEART RATE: 92 BPM | TEMPERATURE: 98.5 F | SYSTOLIC BLOOD PRESSURE: 110 MMHG | OXYGEN SATURATION: 97 % | DIASTOLIC BLOOD PRESSURE: 68 MMHG | RESPIRATION RATE: 16 BRPM | WEIGHT: 166.6 LBS

## 2018-08-02 RX ORDER — OXYCODONE HYDROCHLORIDE AND ACETAMINOPHEN 5; 325 MG/1; MG/1
1 TABLET ORAL EVERY 4 HOURS PRN
Qty: 30 TABLET | Refills: 0 | Status: SHIPPED | OUTPATIENT
Start: 2018-08-02 | End: 2018-08-10

## 2018-08-02 RX ORDER — ONDANSETRON 4 MG/1
4 TABLET, FILM COATED ORAL EVERY 8 HOURS PRN
Qty: 30 TABLET | Refills: 0 | Status: SHIPPED | OUTPATIENT
Start: 2018-08-02 | End: 2018-09-01

## 2018-08-02 RX ORDER — IBUPROFEN 600 MG/1
600 TABLET ORAL EVERY 6 HOURS PRN
Qty: 40 TABLET | Refills: 1 | Status: SHIPPED | OUTPATIENT
Start: 2018-08-02 | End: 2018-09-01

## 2018-08-02 RX ADMIN — POLYETHYLENE GLYCOL (3350) 17 G: 17 POWDER, FOR SOLUTION ORAL at 08:34

## 2018-08-02 RX ADMIN — OXYCODONE HYDROCHLORIDE AND ACETAMINOPHEN 1 TABLET: 5; 325 TABLET ORAL at 10:36

## 2018-08-02 RX ADMIN — DOCUSATE SODIUM 100 MG: 100 CAPSULE, LIQUID FILLED ORAL at 08:34

## 2018-08-02 RX ADMIN — OXYCODONE HYDROCHLORIDE AND ACETAMINOPHEN 1 TABLET: 5; 325 TABLET ORAL at 17:08

## 2018-08-02 NOTE — LACTATION NOTE
40 3/7 week infant delivered 18 @ 1723. Birth weight 6-2.3 (2787 g). Day 2 weight loss - 5%. 1 void, 5 stool in the past 24 hours. Mother has exclusively formula fed. Discussed direct breastfeeding vs pumping and supplementing. Mother wishes to pump and formula feed at this time. She states she will continue to attempt to breastfeed infant and pump. Offered outpatient lactation services. Reviewed milk production, transition of milk, milk supply, on demand feeding, pumping and supplementing, and offered support. Hand pump provided and instructed on use. Encouraged hands on pumping and hand expression, finger feeding infant every drop.     Maternal Hx: , Anemia  Prenatal Meds: PNV, FE, Vit B-12, Colace  Pump: Electric and manual

## 2018-08-02 NOTE — PLAN OF CARE
Problem: Patient Care Overview  Goal: Plan of Care Review  Outcome: Ongoing (interventions implemented as appropriate)   08/02/18 0421   Coping/Psychosocial   Plan of Care Reviewed With patient   Plan of Care Review   Progress improving   OTHER   Outcome Summary VSS. FF ML UU scant lochia. Ambulating in room. Comfort products being used.     Goal: Individualization and Mutuality  Outcome: Ongoing (interventions implemented as appropriate)    Goal: Discharge Needs Assessment  Outcome: Ongoing (interventions implemented as appropriate)    Goal: Interprofessional Rounds/Family Conf  Outcome: Ongoing (interventions implemented as appropriate)      Problem: Postpartum (Vaginal Delivery) (Adult,Obstetrics,Pediatric)  Goal: Signs and Symptoms of Listed Potential Problems Will be Absent, Minimized or Managed (Postpartum)  Outcome: Ongoing (interventions implemented as appropriate)

## 2018-08-03 NOTE — DISCHARGE SUMMARY
[facility]  Delivery Discharge Summary  Yajaira Doss    Primary OB Clinician: Julissa    EDC: Estimated Date of Delivery: 7/28/18    Gestational Age:40w3d    Antepartum complications: Chronic anemia                                                  Large uterine fibroid    Date of Delivery: 7/31/2018   Time of Delivery: 5:23 PM     Delivered By:  Catie Costa     Delivery Type: Vaginal, Spontaneous Delivery      Laceration: Yes  Laceration Information  Midline laceration with 4th degree extension  Please refer to delivery note for details of repair    Placenta: Spontaneous     Feeding method: Breastfeeding Status: Yes    Rh Immune globulin given: not applicable    Rubella vaccine given: not applicable    Discharge Date: 8/2/18 Discharge Time: 8:53 AM    Early Discharge:  NO    Plan:    Address and phone number verified and same.  Follow-up appointment with Julissa in 4 weeks

## 2020-11-17 LAB
EXTERNAL HEPATITIS B SURFACE ANTIGEN: NEGATIVE
EXTERNAL HERPES PCR: NORMAL
EXTERNAL RUBELLA QUALITATIVE: NORMAL
EXTERNAL SYPHILIS RPR SCREEN: NORMAL
HIV1 P24 AG SERPL QL IA: NORMAL

## 2020-12-16 ENCOUNTER — OFFICE VISIT (OUTPATIENT)
Age: 30
End: 2020-12-16

## 2020-12-16 ENCOUNTER — OFFICE VISIT (OUTPATIENT)
Dept: URGENT CARE | Age: 30
End: 2020-12-16
Payer: COMMERCIAL

## 2020-12-16 VITALS
WEIGHT: 129 LBS | HEART RATE: 84 BPM | DIASTOLIC BLOOD PRESSURE: 73 MMHG | HEIGHT: 62 IN | SYSTOLIC BLOOD PRESSURE: 112 MMHG | TEMPERATURE: 98.1 F | BODY MASS INDEX: 23.74 KG/M2 | OXYGEN SATURATION: 100 % | RESPIRATION RATE: 18 BRPM

## 2020-12-16 PROCEDURE — 99213 OFFICE O/P EST LOW 20 MIN: CPT | Performed by: NURSE PRACTITIONER

## 2020-12-16 ASSESSMENT — ENCOUNTER SYMPTOMS
CONSTIPATION: 0
SORE THROAT: 0
CHEST TIGHTNESS: 0
EYES NEGATIVE: 1
WHEEZING: 0
SINUS COMPLAINT: 1
VOMITING: 0
COUGH: 1
SHORTNESS OF BREATH: 0
NAUSEA: 0
DIARRHEA: 0

## 2020-12-16 NOTE — PROGRESS NOTES
[address] [facility]  [address] [place] [address] Sue Mitchell [id number]  Dept: [phone]  Dept Fax: [phone]  Loc: [phone]    Ed Cruz is a 27 y.o. female who presents today for her medical conditions/complaintsas noted below. Ed Cruz is c/o of Nasal Congestion (stuffy nose especially at night when she lies down. patient is also 12 weeks pregnant)        HPI:     Sinus Problem  This is a new problem. The current episode started yesterday. The problem has been gradually worsening since onset. There has been no fever. The fever has been present for 1 to 2 days. The pain is mild. Associated symptoms include congestion, coughing and headaches. Pertinent negatives include no ear pain, shortness of breath or sore throat. Treatments tried: Sudafed as directed per her OBGYN. The treatment provided no relief. History reviewed. No pertinent past medical history. History reviewed. No pertinent surgical history. History reviewed. No pertinent family history. Social History     Tobacco Use    Smoking status: Never Smoker    Smokeless tobacco: Never Used   Substance Use Topics    Alcohol use: Not Currently      Current Outpatient Medications   Medication Sig Dispense Refill    Prenatal Vit-Fe Fumarate-FA (PRENATAL VITAMINS PO) Take by mouth       No current facility-administered medications for this visit. No Known Allergies    Health Maintenance   Topic Date Due    Varicella vaccine (1 of 2 - 2-dose childhood series) 07/27/1991    HIV screen  07/27/2005    DTaP/Tdap/Td vaccine (1 - Tdap) 07/27/2009    Cervical cancer screen  07/27/2011    Flu vaccine (1) 09/01/2020    Hepatitis A vaccine  Aged Out    Hepatitis B vaccine  Aged Out    Hib vaccine  Aged Out    Meningococcal (ACWY) vaccine  Aged Out    Pneumococcal 0-64 years Vaccine  Aged Out       Subjective:     Review of Systems   Constitutional: Positive for fatigue. Negative for fever.    HENT: Positive for congestion. Negative for ear pain and sore throat. Eyes: Negative. Respiratory: Positive for cough. Negative for chest tightness, shortness of breath and wheezing. Cardiovascular: Negative for chest pain and palpitations. Gastrointestinal: Negative for constipation, diarrhea, nausea and vomiting. Endocrine: Negative. Genitourinary: Negative. Musculoskeletal: Negative. Skin: Negative. Neurological: Positive for headaches. Negative for dizziness, speech difficulty, weakness and numbness. Psychiatric/Behavioral: Negative for confusion. All other systems reviewed and are negative. Objective:     Physical Exam  Vitals signs and nursing note reviewed. Constitutional:       General: She is not in acute distress. Appearance: Normal appearance. She is not ill-appearing or toxic-appearing. HENT:      Head: Normocephalic and atraumatic. Right Ear: Tympanic membrane, ear canal and external ear normal.      Left Ear: Tympanic membrane, ear canal and external ear normal.      Nose: Congestion present. Mouth/Throat:      Mouth: Mucous membranes are moist.      Pharynx: Oropharynx is clear. Eyes:      Extraocular Movements: Extraocular movements intact. Conjunctiva/sclera: Conjunctivae normal.      Pupils: Pupils are equal, round, and reactive to light. Cardiovascular:      Rate and Rhythm: Normal rate and regular rhythm. Heart sounds: Normal heart sounds. No murmur. Pulmonary:      Effort: Pulmonary effort is normal. No respiratory distress. Breath sounds: Normal breath sounds. Musculoskeletal:         General: No swelling or signs of injury. Skin:     General: Skin is warm and dry. Findings: No rash. Neurological:      General: No focal deficit present. Mental Status: She is alert and oriented to person, place, and time. Motor: No weakness.    Psychiatric:         Mood and Affect: Mood normal.       /73   Pulse 84 spread. o The determination of whether or not to separate you and your baby at the time of birth will be decided using shared decision-making between you and your clinical team.  o After your baby is born, your health care provider may recommend you not hold your baby and/or that you stay in a separate room from your baby until you get better.  o If you and your baby are not , wear a facemask at all times and wash your hands thoroughly before touching, holding or feeding your baby. Baby Care & Feeding    Breastfeeding has many benefits for you and your baby and is the best food for your baby. From what experts know so far, COVID-19 has not been found in breastmilk.  Having a healthy adult who can assist with baby care until you get better is important, you may want to have a healthy adult feed your baby your expressed breast milk or formula if you chose to not breastfeed.  You may use a breast pump to express your breast milk. Wear a face mask, wash your breasts, then wash your hands thoroughly before touching the breast pump and bottle parts. Clean all pump parts after each use.  If you choose to breastfeed from your breast, wear a face mask, wash your breasts, wash your hands thoroughly before feeding your baby. Ways to [place] with Anxiety & Stress   It is normal to feel anxious or worried about COVID-19. You might feel sad about canceling celebrations and staying away from family and friends.  Keep in mind that most people do not get severely ill from COVID-19. It is important to have a plan in case you get sick to prevent spreading the disease to others including an [address] (communicating and documenting your desired health care plan with family and healthcare team).     You can take care of yourself by:  o Taking a break from watching the news  o Take deep breaths, stretch or meditate  o Getting exercise, eating healthy foods, and drinking plenty of water  o Finding activities you can enjoy inside your home  o Staying in touch with your family and friends. Tell your partner, family, and friends how you are feeling. When should you call for help? Call 911 anytime you think you may need emergency care. These post-birth warning signs can become life-threatening if you dont receive medical care right away:     Pain in chest, obstructed breathing or shortness of breath (trouble catching your breath) may mean you have a blood clot in your lung or a heart problem or COVID-19     Seizures may mean you have a condition called eclampsia     Thoughts or feelings of wanting to hurt yourself or someone else may mean you have postpartum depression    These could be signs that your COVID-19 symptoms are worsening and you may need emergency care:    ·         You are severely dizzy or lightheaded. ·         You are confused or can't think clearly. ·         Your face and lips have a blue color. ·         You are unable to respond to others or are very hard to wake up.     Call your healthcare provider if you have: (If you cant reach your healthcare provider, call 911 or go to an emergency room)     Heavy Bleeding, soaking more than one pad in an hour or passing an egg-sized clot or bigger may mean you have an obstetric hemorrhage     Incision that is not healing, increased redness or any pus from episiotomy or  site may mean you have an infection     Redness, swelling, warmth, or pain in the calf area of your leg may mean you have a blood clot     Temperature of 100.4°F or higher, bad smelling vaginal blood or discharge may mean you have an infection      Headache (very painful), vision changes, or pain in the upper right area of your belly may mean you have high blood pressure or post birth preeclampsia    Call your provider before your next appointment if you develop any of the following symptoms:  ·     fever, cough, fatigue, anorexia, shortness of breath, sputum production, and muscle pains. Headache, confusion, rhinorrhea, sore throat, hemoptysis, vomiting, and diarrhea have been reported but are less common. Some persons with COVID-19 have experienced gastrointestinal symptoms such as diarrhea and nausea prior to developing fever and lower respiratory tract signs and symptoms. Center for Disease Control and Prevention. Coronavirus Disease 2019 (COVID-19) Inpatient Obstetric Healthcare Guidance. Interim Considerations for Infection Prevention and Control of Coronavirus Disease 2019 (COVID-19) in [facility]. [employer]  Association of Womens Health, Obstetric, and  Nurses. [facility] Birth Warning Signs, 2018.                          Electronically signed by OLIVE Gaviria CNP on 2020 at 6:01 PM

## 2020-12-16 NOTE — PATIENT INSTRUCTIONS
You have been tested for coronavirus using a nasopharyngeal swab. You are to quarantine until your test results are back. This typically takes 2-3 days and we will call you with results. Medicines you can try are all over the counter:  Plain Robitussin for cough  Plain Claritin for nasal congestion  Tylenol for fever, headache, body aches. 1. Rest and increase water intake. 2. Monitor for fever and treat as needed with over the counter Tylenol per package instructions. 3. Treat symptoms such as cough/congestion with over the counter medications as discussed. 4. Go to ED if symptoms worsen or if you experience chest pain, shortness of breath, or a fever that is uncontrolled with medication. Patient Education   Coronavirus (DCVVF-33): Pregnancy, Birth and Baby Care    What do you need know if you are pregnant regarding coronavirus (COVID-19)?  From what experts know so far, pregnant people do NOT seem more likely than others to get COVID-19 and do NOT have a higher risk of severe complications. What can you do to protect yourself from COVID-19?  Practice social distancing.   When you need to leave the home try to stay at least 6 feet away from other people. Avoid large crowds.  When you leave the house to prevent spreading sickness to others - Wear a facemask covering your mouth and nose. Remove by folding outside of mask together and place in container, wash daily or as soiled.  Wash your hands often by rubbing your hands with soap and water for at least 20 seconds, rinse, and dry with a paper towel that can be thrown away or may use hand  of at least 60% alcohol. Covering all surfaces of your hands by rubbing them together until dry.  Avoid touching your face with unwashed hands, especially your mouth, nose and eyes.  Avoid traveling. What should you do if you have or think you may have COVID-19?    For most infected, this is a mild illness and you will be able to are worsening and you may need emergency care:    ·         You are severely dizzy or lightheaded. ·         You are confused or can't think clearly. ·         Your face and lips have a blue color. ·         You are unable to respond to others or are very hard to wake up. Call your healthcare provider if you have: (If you cant reach your healthcare provider, call 911 or go to an emergency room)     Heavy Bleeding, soaking more than one pad in an hour or passing an egg-sized clot or bigger may mean you have an obstetric hemorrhage     Incision that is not healing, increased redness or any pus from episiotomy or  site may mean you have an infection     Redness, swelling, warmth, or pain in the calf area of your leg may mean you have a blood clot     Temperature of 100.4°F or higher, bad smelling vaginal blood or discharge may mean you have an infection      Headache (very painful), vision changes, or pain in the upper right area of your belly may mean you have high blood pressure or post birth preeclampsia    Call your provider before your next appointment if you develop any of the following symptoms:  ·     fever, cough, fatigue, anorexia, shortness of breath, sputum production, and muscle pains. Headache, confusion, rhinorrhea, sore throat, hemoptysis, vomiting, and diarrhea have been reported but are less common. Some persons with COVID-19 have experienced gastrointestinal symptoms such as diarrhea and nausea prior to developing fever and lower respiratory tract signs and symptoms. Center for Disease Control and Prevention. Coronavirus Disease 2019 (COVID-19) Inpatient Obstetric Healthcare Guidance. Interim Considerations for Infection Prevention and Control of Coronavirus Disease 2019 (COVID-19) in [facility].   [email or web address]  Association of Womens Health, Obstetric, and  Nurses. [facility] Birth Warning Signs, 2018.

## 2020-12-18 LAB — SARS-COV-2, NAA: NOT DETECTED

## 2021-03-15 ENCOUNTER — INITIAL PRENATAL (OUTPATIENT)
Dept: OBSTETRICS AND GYNECOLOGY | Facility: CLINIC | Age: 31
End: 2021-03-15

## 2021-03-15 VITALS — DIASTOLIC BLOOD PRESSURE: 64 MMHG | SYSTOLIC BLOOD PRESSURE: 102 MMHG | BODY MASS INDEX: 25.97 KG/M2 | WEIGHT: 142 LBS

## 2021-03-15 DIAGNOSIS — Z34.82 ENCOUNTER FOR SUPERVISION OF OTHER NORMAL PREGNANCY IN SECOND TRIMESTER: Primary | ICD-10-CM

## 2021-03-15 PROBLEM — Z37.9 NORMAL LABOR: Status: RESOLVED | Noted: 2018-07-31 | Resolved: 2021-03-15

## 2021-03-15 PROBLEM — Z34.93 THIRD TRIMESTER PREGNANCY: Status: RESOLVED | Noted: 2018-07-31 | Resolved: 2021-03-15

## 2021-03-15 LAB
GLUCOSE UR STRIP-MCNC: NEGATIVE MG/DL
PROT UR STRIP-MCNC: NEGATIVE MG/DL

## 2021-03-15 PROCEDURE — 0501F PRENATAL FLOW SHEET: CPT | Performed by: OBSTETRICS & GYNECOLOGY

## 2021-03-15 NOTE — PROGRESS NOTES
Transfer of care from Dr Cheney  Request records, reports uncomplicated pregnancy thus far  Prior uncomplicated pregnancy and vaginal delivery  Reports she has a uterine fibroid, followed last pregnancy  Schedule anatomy US  Desires ffDNA, hasn't drawn before  Glucola next visit

## 2021-03-31 ENCOUNTER — TELEPHONE (OUTPATIENT)
Dept: OBSTETRICS AND GYNECOLOGY | Facility: CLINIC | Age: 31
End: 2021-03-31

## 2021-04-01 NOTE — TELEPHONE ENCOUNTER
Panorama result printed from [employer] website and reviewed with Dr Ndiaye. Pt informed of normal ffDNA, low risk for DS. Pt was already aware of gender. Pt voiced understanding.

## 2021-04-01 NOTE — TELEPHONE ENCOUNTER
I think she had a Panorama panel drawn.  Can you check and see if results are back?  It has been 2 weeks.

## 2021-04-06 ENCOUNTER — ROUTINE PRENATAL (OUTPATIENT)
Dept: OBSTETRICS AND GYNECOLOGY | Facility: CLINIC | Age: 31
End: 2021-04-06

## 2021-04-06 VITALS — SYSTOLIC BLOOD PRESSURE: 112 MMHG | DIASTOLIC BLOOD PRESSURE: 64 MMHG | WEIGHT: 148 LBS | BODY MASS INDEX: 27.07 KG/M2

## 2021-04-06 DIAGNOSIS — Z34.83 ENCOUNTER FOR SUPERVISION OF OTHER NORMAL PREGNANCY IN THIRD TRIMESTER: Primary | ICD-10-CM

## 2021-04-06 DIAGNOSIS — Z34.93 PRENATAL CARE IN THIRD TRIMESTER: ICD-10-CM

## 2021-04-06 PROBLEM — Z34.90 SUPERVISION OF NORMAL PREGNANCY: Status: ACTIVE | Noted: 2021-04-06

## 2021-04-06 LAB
GLUCOSE 1H P 50 G GLC PO SERPL-MCNC: 103 MG/DL (ref 65–139)
HGB BLD-MCNC: 9.7 G/DL (ref 12–15.9)

## 2021-04-06 PROCEDURE — 0502F SUBSEQUENT PRENATAL CARE: CPT | Performed by: OBSTETRICS & GYNECOLOGY

## 2021-04-06 NOTE — PATIENT INSTRUCTIONS
Timmy Randle Contractions  Contractions of the uterus can occur throughout pregnancy, but they are not always a sign that you are in labor. You may have practice contractions called Sawyer Randle contractions. These false labor contractions are sometimes confused with true labor.  What are Sawyer Randle contractions?  Timmy Randle contractions are tightening movements that occur in the muscles of the uterus before labor. Unlike true labor contractions, these contractions do not result in opening (dilation) and thinning of the cervix. Toward the end of pregnancy (32-34 weeks), Sawyer Randle contractions can happen more often and may become stronger. These contractions are sometimes difficult to tell apart from true labor because they can be very uncomfortable. You should not feel embarrassed if you go to the hospital with false labor.  Sometimes, the only way to tell if you are in true labor is for your health care provider to look for changes in the cervix. The health care provider will do a physical exam and may monitor your contractions. If you are not in true labor, the exam should show that your cervix is not dilating and your water has not broken.  If there are no other health problems associated with your pregnancy, it is completely safe for you to be sent home with false labor. You may continue to have Sawyer Randle contractions until you go into true labor.  How to tell the difference between true labor and false labor  True labor  · Contractions last 30-70 seconds.  · Contractions become very regular.  · Discomfort is usually felt in the top of the uterus, and it spreads to the lower abdomen and low back.  · Contractions do not go away with walking.  · Contractions usually become more intense and increase in frequency.  · The cervix dilates and gets thinner.  False labor  · Contractions are usually shorter and not as strong as true labor contractions.  · Contractions are usually irregular.  · Contractions  are often felt in the front of the lower abdomen and in the groin.  · Contractions may go away when you walk around or change positions while lying down.  · Contractions get weaker and are shorter-lasting as time goes on.  · The cervix usually does not dilate or become thin.  Follow these instructions at home:    · Take over-the-counter and prescription medicines only as told by your health care provider.  · Keep up with your usual exercises and follow other instructions from your health care provider.  · Eat and drink lightly if you think you are going into labor.  · If [place] Randle contractions are making you uncomfortable:  ? Change your position from lying down or resting to walking, or change from walking to resting.  ? Sit and rest in a tub of warm water.  ? Drink enough fluid to keep your urine pale yellow. Dehydration may cause these contractions.  ? Do slow and deep breathing several times an hour.  · Keep all follow-up prenatal visits as told by your health care provider. This is important.  Contact a health care provider if:  · You have a fever.  · You have continuous pain in your abdomen.  Get help right away if:  · Your contractions become stronger, more regular, and closer together.  · You have fluid leaking or gushing from your vagina.  · You pass blood-tinged mucus (bloody show).  · You have bleeding from your vagina.  · You have low back pain that you never had before.  · You feel your baby’s head pushing down and causing pelvic pressure.  · Your baby is not moving inside you as much as it used to.  Summary  · Contractions that occur before labor are called Timmy Randle contractions, false labor, or practice contractions.  · Washakie Randle contractions are usually shorter, weaker, farther apart, and less regular than true labor contractions. True labor contractions usually become progressively stronger and regular, and they become more frequent.  · Manage discomfort from Washakie Randle contractions  by changing position, resting in a warm bath, drinking plenty of water, or practicing deep breathing.  This information is not intended to replace advice given to you by your health care provider. Make sure you discuss any questions you have with your health care provider.  Document Revised: 11/30/2018 Document Reviewed: 05/03/2018  Elsevier Patient Education © 2021 [employer]

## 2021-04-06 NOTE — PROGRESS NOTES
Good fetal movement  Reviewed anatomy ultrasound with EIF  ffDNA normal, GIRL!  Glucola and Hgb today, Rh positive  Discussed [place] Randle contractions    Diagnoses and all orders for this visit:    1. Encounter for supervision of other normal pregnancy in third trimester (Primary)    2. Prenatal care in third trimester  -     Gestational Screen 1 Hr ([facility])  -     Hemoglobin

## 2021-04-23 ENCOUNTER — ROUTINE PRENATAL (OUTPATIENT)
Dept: OBSTETRICS AND GYNECOLOGY | Facility: CLINIC | Age: 31
End: 2021-04-23

## 2021-04-23 VITALS — BODY MASS INDEX: 27.25 KG/M2 | WEIGHT: 149 LBS | SYSTOLIC BLOOD PRESSURE: 100 MMHG | DIASTOLIC BLOOD PRESSURE: 64 MMHG

## 2021-04-23 DIAGNOSIS — Z34.83 ENCOUNTER FOR SUPERVISION OF OTHER NORMAL PREGNANCY IN THIRD TRIMESTER: Primary | ICD-10-CM

## 2021-04-23 LAB
GLUCOSE UR STRIP-MCNC: NEGATIVE MG/DL
PROT UR STRIP-MCNC: NEGATIVE MG/DL

## 2021-04-23 PROCEDURE — 90471 IMMUNIZATION ADMIN: CPT | Performed by: OBSTETRICS & GYNECOLOGY

## 2021-04-23 PROCEDURE — 0502F SUBSEQUENT PRENATAL CARE: CPT | Performed by: OBSTETRICS & GYNECOLOGY

## 2021-04-23 PROCEDURE — 90715 TDAP VACCINE 7 YRS/> IM: CPT | Performed by: OBSTETRICS & GYNECOLOGY

## 2021-04-23 RX ORDER — FERROUS SULFATE 325(65) MG
325 TABLET ORAL
COMMUNITY
End: 2021-12-06

## 2021-04-23 NOTE — PROGRESS NOTES
Good fetal movement  No contractions, no reflux  Reviewed normal Glucola   Iron supplement for anemia  Tdap in office today   labor precautions    Diagnoses and all orders for this visit:    1. Encounter for supervision of other normal pregnancy in third trimester (Primary)  -     Tdap Vaccine Greater Than or Equal To 6yo IM

## 2021-05-13 ENCOUNTER — ROUTINE PRENATAL (OUTPATIENT)
Dept: OBSTETRICS AND GYNECOLOGY | Facility: CLINIC | Age: 31
End: 2021-05-13

## 2021-05-13 VITALS — DIASTOLIC BLOOD PRESSURE: 62 MMHG | BODY MASS INDEX: 27.62 KG/M2 | SYSTOLIC BLOOD PRESSURE: 100 MMHG | WEIGHT: 151 LBS

## 2021-05-13 DIAGNOSIS — Z34.83 ENCOUNTER FOR SUPERVISION OF OTHER NORMAL PREGNANCY IN THIRD TRIMESTER: Primary | ICD-10-CM

## 2021-05-13 LAB
GLUCOSE UR STRIP-MCNC: NEGATIVE MG/DL
PROT UR STRIP-MCNC: NEGATIVE MG/DL

## 2021-05-13 PROCEDURE — 0502F SUBSEQUENT PRENATAL CARE: CPT | Performed by: OBSTETRICS & GYNECOLOGY

## 2021-05-13 NOTE — PROGRESS NOTES
Good fetal movement  No contractions, no reflux   labor precautions    Diagnoses and all orders for this visit:    1. Encounter for supervision of other normal pregnancy in third trimester (Primary)

## 2021-05-27 ENCOUNTER — ROUTINE PRENATAL (OUTPATIENT)
Dept: OBSTETRICS AND GYNECOLOGY | Facility: CLINIC | Age: 31
End: 2021-05-27

## 2021-05-27 VITALS — SYSTOLIC BLOOD PRESSURE: 98 MMHG | WEIGHT: 153 LBS | DIASTOLIC BLOOD PRESSURE: 62 MMHG | BODY MASS INDEX: 27.98 KG/M2

## 2021-05-27 DIAGNOSIS — Z34.83 ENCOUNTER FOR SUPERVISION OF OTHER NORMAL PREGNANCY IN THIRD TRIMESTER: Primary | ICD-10-CM

## 2021-05-27 DIAGNOSIS — R30.9 PAIN WITH URINATION: ICD-10-CM

## 2021-05-27 LAB
BILIRUB BLD-MCNC: NEGATIVE MG/DL
CLARITY, POC: CLEAR
COLOR UR: YELLOW
GLUCOSE UR STRIP-MCNC: NEGATIVE MG/DL
GLUCOSE UR STRIP-MCNC: NEGATIVE MG/DL
KETONES UR QL: NEGATIVE
LEUKOCYTE EST, POC: ABNORMAL
NITRITE UR-MCNC: NEGATIVE MG/ML
PH UR: 5 [PH] (ref 5–8)
PROT UR STRIP-MCNC: NEGATIVE MG/DL
PROT UR STRIP-MCNC: NEGATIVE MG/DL
RBC # UR STRIP: NEGATIVE /UL
SP GR UR: 1.01 (ref 1–1.03)
UROBILINOGEN UR QL: NORMAL

## 2021-05-27 PROCEDURE — 0502F SUBSEQUENT PRENATAL CARE: CPT | Performed by: OBSTETRICS & GYNECOLOGY

## 2021-05-27 NOTE — PROGRESS NOTES
Good fetal movement  Labor precautions  Urine culture for mild dysuria  GBS and cx's next visit    Diagnoses and all orders for this visit:    1. Encounter for supervision of other normal pregnancy in third trimester (Primary)    2. Pain with urination  -     Urine Culture - Urine, Urine, Clean Catch

## 2021-05-29 LAB
BACTERIA UR CULT: NO GROWTH
BACTERIA UR CULT: NORMAL

## 2021-06-03 ENCOUNTER — ROUTINE PRENATAL (OUTPATIENT)
Dept: OBSTETRICS AND GYNECOLOGY | Facility: CLINIC | Age: 31
End: 2021-06-03

## 2021-06-03 VITALS — WEIGHT: 154 LBS | BODY MASS INDEX: 28.17 KG/M2 | DIASTOLIC BLOOD PRESSURE: 64 MMHG | SYSTOLIC BLOOD PRESSURE: 108 MMHG

## 2021-06-03 DIAGNOSIS — Z34.83 ENCOUNTER FOR SUPERVISION OF OTHER NORMAL PREGNANCY IN THIRD TRIMESTER: Primary | ICD-10-CM

## 2021-06-03 LAB
GLUCOSE UR STRIP-MCNC: NEGATIVE MG/DL
PROT UR STRIP-MCNC: NEGATIVE MG/DL

## 2021-06-03 PROCEDURE — 0502F SUBSEQUENT PRENATAL CARE: CPT | Performed by: OBSTETRICS & GYNECOLOGY

## 2021-06-03 NOTE — PROGRESS NOTES
Good fetal movement  Has had a few contractions  Cervix moderate, posterior  GBS and GC/Chl ordered and done  Labor instructions    Diagnoses and all orders for this visit:    1. Encounter for supervision of other normal pregnancy in third trimester (Primary)  -     Chlamydia trachomatis, Neisseria gonorrhoeae, PCR w/ confirmation - Swab, Cervix  -     Strep B Screen - Swab, Vaginal/Rectum  -     Hemoglobin

## 2021-06-04 LAB — HGB BLD-MCNC: 10.7 G/DL (ref 12–15.9)

## 2021-06-05 LAB
C TRACH RRNA SPEC QL NAA+PROBE: NEGATIVE
GP B STREP DNA SPEC QL NAA+PROBE: NEGATIVE
N GONORRHOEA RRNA SPEC QL NAA+PROBE: NEGATIVE

## 2021-06-10 ENCOUNTER — ROUTINE PRENATAL (OUTPATIENT)
Dept: OBSTETRICS AND GYNECOLOGY | Facility: CLINIC | Age: 31
End: 2021-06-10

## 2021-06-10 VITALS — DIASTOLIC BLOOD PRESSURE: 74 MMHG | SYSTOLIC BLOOD PRESSURE: 104 MMHG | WEIGHT: 157 LBS | BODY MASS INDEX: 28.72 KG/M2

## 2021-06-10 DIAGNOSIS — Z34.83 ENCOUNTER FOR SUPERVISION OF OTHER NORMAL PREGNANCY IN THIRD TRIMESTER: Primary | ICD-10-CM

## 2021-06-10 LAB
GLUCOSE UR STRIP-MCNC: NEGATIVE MG/DL
PROT UR STRIP-MCNC: NEGATIVE MG/DL

## 2021-06-10 PROCEDURE — 0502F SUBSEQUENT PRENATAL CARE: CPT | Performed by: OBSTETRICS & GYNECOLOGY

## 2021-06-10 NOTE — PROGRESS NOTES
Good fetal movement  No contractions  Hgb 10.7   Reviewed GBS negative  Labor instructions    Diagnoses and all orders for this visit:    1. Encounter for supervision of other normal pregnancy in third trimester (Primary)

## 2021-06-17 ENCOUNTER — ROUTINE PRENATAL (OUTPATIENT)
Dept: OBSTETRICS AND GYNECOLOGY | Facility: CLINIC | Age: 31
End: 2021-06-17

## 2021-06-17 VITALS — SYSTOLIC BLOOD PRESSURE: 108 MMHG | WEIGHT: 159 LBS | DIASTOLIC BLOOD PRESSURE: 64 MMHG | BODY MASS INDEX: 29.08 KG/M2

## 2021-06-17 DIAGNOSIS — Z34.83 ENCOUNTER FOR SUPERVISION OF OTHER NORMAL PREGNANCY IN THIRD TRIMESTER: Primary | ICD-10-CM

## 2021-06-17 LAB
GLUCOSE UR STRIP-MCNC: NEGATIVE MG/DL
PROT UR STRIP-MCNC: NEGATIVE MG/DL

## 2021-06-17 PROCEDURE — 0502F SUBSEQUENT PRENATAL CARE: CPT | Performed by: OBSTETRICS & GYNECOLOGY

## 2021-06-17 NOTE — PROGRESS NOTES
at 38 weeks IUP Presents for follow up prenatal care visit. No obstetrical complaints.   PE   SVE: 1 cm   A/P  at 38 weeks IUP   RTC in 1 week   Labor precautions

## 2021-06-24 ENCOUNTER — ROUTINE PRENATAL (OUTPATIENT)
Dept: OBSTETRICS AND GYNECOLOGY | Facility: CLINIC | Age: 31
End: 2021-06-24

## 2021-06-24 VITALS — WEIGHT: 157 LBS | DIASTOLIC BLOOD PRESSURE: 64 MMHG | SYSTOLIC BLOOD PRESSURE: 108 MMHG | BODY MASS INDEX: 28.72 KG/M2

## 2021-06-24 DIAGNOSIS — Z34.83 ENCOUNTER FOR SUPERVISION OF OTHER NORMAL PREGNANCY IN THIRD TRIMESTER: Primary | ICD-10-CM

## 2021-06-24 LAB
GLUCOSE UR STRIP-MCNC: NEGATIVE MG/DL
PROT UR STRIP-MCNC: NEGATIVE MG/DL

## 2021-06-24 PROCEDURE — 0502F SUBSEQUENT PRENATAL CARE: CPT | Performed by: OBSTETRICS & GYNECOLOGY

## 2021-06-24 NOTE — PROGRESS NOTES
Good fetal movement  No contractions  Cervix soft, posterior  Reviewed GBS negative  Labor instructions    Diagnoses and all orders for this visit:    1. Encounter for supervision of other normal pregnancy in third trimester (Primary)        57M with asthma/COPD presenting with likely asthma exacerbation. CBC/CMP/cardiac enzymes/pro-BNP/CXR; Duonebs as needed; Solumedrol 125mg x 1

## 2021-07-01 ENCOUNTER — ROUTINE PRENATAL (OUTPATIENT)
Dept: OBSTETRICS AND GYNECOLOGY | Facility: CLINIC | Age: 31
End: 2021-07-01

## 2021-07-01 VITALS — DIASTOLIC BLOOD PRESSURE: 64 MMHG | BODY MASS INDEX: 28.72 KG/M2 | WEIGHT: 157 LBS | SYSTOLIC BLOOD PRESSURE: 104 MMHG

## 2021-07-01 DIAGNOSIS — Z34.83 ENCOUNTER FOR SUPERVISION OF OTHER NORMAL PREGNANCY IN THIRD TRIMESTER: Primary | ICD-10-CM

## 2021-07-01 LAB
GLUCOSE UR STRIP-MCNC: NEGATIVE MG/DL
PROT UR STRIP-MCNC: NEGATIVE MG/DL

## 2021-07-01 PROCEDURE — 59426 ANTEPARTUM CARE ONLY: CPT | Performed by: OBSTETRICS & GYNECOLOGY

## 2021-07-01 NOTE — PROGRESS NOTES
Good fetal movement  Had some contractions earlier this morning  Cervix soft, mid position   Labor instructions    Diagnoses and all orders for this visit:    1. Encounter for supervision of other normal pregnancy in third trimester (Primary)

## 2021-07-05 ENCOUNTER — HOSPITAL ENCOUNTER (INPATIENT)
Facility: HOSPITAL | Age: 31
LOS: 3 days | Discharge: HOME | End: 2021-07-08
Attending: OBSTETRICS & GYNECOLOGY | Admitting: OBSTETRICS & GYNECOLOGY

## 2021-07-05 ENCOUNTER — ANESTHESIA (OUTPATIENT)
Dept: LABOR AND DELIVERY | Facility: HOSPITAL | Age: 31
End: 2021-07-05

## 2021-07-05 ENCOUNTER — ANESTHESIA EVENT (OUTPATIENT)
Dept: LABOR AND DELIVERY | Facility: HOSPITAL | Age: 31
End: 2021-07-05

## 2021-07-05 PROBLEM — Z34.90 PREGNANT AND NOT YET DELIVERED: Status: ACTIVE | Noted: 2021-07-05

## 2021-07-05 PROBLEM — Z34.90 SUPERVISION OF NORMAL PREGNANCY: Status: RESOLVED | Noted: 2021-04-06 | Resolved: 2021-07-05

## 2021-07-05 PROBLEM — Z34.90 PREGNANT AND NOT YET DELIVERED: Status: RESOLVED | Noted: 2021-07-05 | Resolved: 2021-07-05

## 2021-07-05 LAB
ABO GROUP BLD: NORMAL
BACTERIA UR QL AUTO: ABNORMAL /HPF
BASOPHILS # BLD AUTO: 0.05 10*3/MM3 (ref 0–0.2)
BASOPHILS NFR BLD AUTO: 0.4 % (ref 0–1.5)
BILIRUB UR QL STRIP: NEGATIVE
BLD GP AB SCN SERPL QL: NEGATIVE
CLARITY UR: CLEAR
COLOR UR: YELLOW
DEPRECATED RDW RBC AUTO: 44.5 FL (ref 37–54)
EOSINOPHIL # BLD AUTO: 0.07 10*3/MM3 (ref 0–0.4)
EOSINOPHIL NFR BLD AUTO: 0.6 % (ref 0.3–6.2)
ERYTHROCYTE [DISTWIDTH] IN BLOOD BY AUTOMATED COUNT: 13.7 % (ref 12.3–15.4)
GLUCOSE UR STRIP-MCNC: NEGATIVE MG/DL
HCT VFR BLD AUTO: 33.9 % (ref 34–46.6)
HGB BLD-MCNC: 11.7 G/DL (ref 12–15.9)
HGB UR QL STRIP.AUTO: NEGATIVE
HYALINE CASTS UR QL AUTO: ABNORMAL /LPF
IMM GRANULOCYTES # BLD AUTO: 0.22 10*3/MM3 (ref 0–0.05)
IMM GRANULOCYTES NFR BLD AUTO: 1.9 % (ref 0–0.5)
KETONES UR QL STRIP: NEGATIVE
LEUKOCYTE ESTERASE UR QL STRIP.AUTO: ABNORMAL
LYMPHOCYTES # BLD AUTO: 3.33 10*3/MM3 (ref 0.7–3.1)
LYMPHOCYTES NFR BLD AUTO: 28.3 % (ref 19.6–45.3)
MCH RBC QN AUTO: 30.9 PG (ref 26.6–33)
MCHC RBC AUTO-ENTMCNC: 34.5 G/DL (ref 31.5–35.7)
MCV RBC AUTO: 89.4 FL (ref 79–97)
MONOCYTES # BLD AUTO: 0.81 10*3/MM3 (ref 0.1–0.9)
MONOCYTES NFR BLD AUTO: 6.9 % (ref 5–12)
NEUTROPHILS NFR BLD AUTO: 61.9 % (ref 42.7–76)
NEUTROPHILS NFR BLD AUTO: 7.28 10*3/MM3 (ref 1.7–7)
NITRITE UR QL STRIP: NEGATIVE
NRBC BLD AUTO-RTO: 0 /100 WBC (ref 0–0.2)
PH UR STRIP.AUTO: 6.5 [PH] (ref 5–8)
PLATELET # BLD AUTO: 177 10*3/MM3 (ref 140–450)
PMV BLD AUTO: 10.7 FL (ref 6–12)
PROT UR QL STRIP: NEGATIVE
RBC # BLD AUTO: 3.79 10*6/MM3 (ref 3.77–5.28)
RBC # UR: ABNORMAL /HPF
REF LAB TEST METHOD: ABNORMAL
RH BLD: POSITIVE
SP GR UR STRIP: <=1.005 (ref 1–1.03)
SQUAMOUS #/AREA URNS HPF: ABNORMAL /HPF
T&S EXPIRATION DATE: NORMAL
UROBILINOGEN UR QL STRIP: ABNORMAL
WBC # BLD AUTO: 11.76 10*3/MM3 (ref 3.4–10.8)
WBC UR QL AUTO: ABNORMAL /HPF

## 2021-07-05 PROCEDURE — 10907ZC DRAINAGE OF AMNIOTIC FLUID, THERAPEUTIC FROM PRODUCTS OF CONCEPTION, VIA NATURAL OR ARTIFICIAL OPENING: ICD-10-PCS | Performed by: OBSTETRICS & GYNECOLOGY

## 2021-07-05 PROCEDURE — 86850 RBC ANTIBODY SCREEN: CPT | Performed by: OBSTETRICS & GYNECOLOGY

## 2021-07-05 PROCEDURE — 88307 TISSUE EXAM BY PATHOLOGIST: CPT | Performed by: OBSTETRICS & GYNECOLOGY

## 2021-07-05 PROCEDURE — 36415 COLL VENOUS BLD VENIPUNCTURE: CPT | Performed by: OBSTETRICS & GYNECOLOGY

## 2021-07-05 PROCEDURE — 25010000002 ONDANSETRON PER 1 MG: Performed by: OBSTETRICS & GYNECOLOGY

## 2021-07-05 PROCEDURE — 25010000002 PHENYLEPHRINE HCL 0.8 MG/10ML SOLUTION PREFILLED SYRINGE: Performed by: NURSE ANESTHETIST, CERTIFIED REGISTERED

## 2021-07-05 PROCEDURE — 25010000002 METHYLERGONOVINE MALEATE PER 0.2 MG: Performed by: NURSE ANESTHETIST, CERTIFIED REGISTERED

## 2021-07-05 PROCEDURE — 86900 BLOOD TYPING SEROLOGIC ABO: CPT | Performed by: OBSTETRICS & GYNECOLOGY

## 2021-07-05 PROCEDURE — 25010000003 CEFAZOLIN PER 500 MG: Performed by: NURSE ANESTHETIST, CERTIFIED REGISTERED

## 2021-07-05 PROCEDURE — 25010000002 PROPOFOL 10 MG/ML EMULSION: Performed by: NURSE ANESTHETIST, CERTIFIED REGISTERED

## 2021-07-05 PROCEDURE — 81001 URINALYSIS AUTO W/SCOPE: CPT | Performed by: OBSTETRICS & GYNECOLOGY

## 2021-07-05 PROCEDURE — S0260 H&P FOR SURGERY: HCPCS | Performed by: OBSTETRICS & GYNECOLOGY

## 2021-07-05 PROCEDURE — 86901 BLOOD TYPING SEROLOGIC RH(D): CPT | Performed by: OBSTETRICS & GYNECOLOGY

## 2021-07-05 PROCEDURE — 25010000002 MORPHINE PER 10 MG: Performed by: OBSTETRICS & GYNECOLOGY

## 2021-07-05 PROCEDURE — 0KQM0ZZ REPAIR PERINEUM MUSCLE, OPEN APPROACH: ICD-10-PCS | Performed by: OBSTETRICS & GYNECOLOGY

## 2021-07-05 PROCEDURE — 59410 OBSTETRICAL CARE: CPT | Performed by: OBSTETRICS & GYNECOLOGY

## 2021-07-05 PROCEDURE — 25010000002 ONDANSETRON PER 1 MG: Performed by: NURSE ANESTHETIST, CERTIFIED REGISTERED

## 2021-07-05 PROCEDURE — 85025 COMPLETE CBC W/AUTO DIFF WBC: CPT | Performed by: OBSTETRICS & GYNECOLOGY

## 2021-07-05 PROCEDURE — 25010000002 FENTANYL CITRATE (PF) 250 MCG/5ML SOLUTION: Performed by: NURSE ANESTHETIST, CERTIFIED REGISTERED

## 2021-07-05 RX ORDER — LIDOCAINE HYDROCHLORIDE 20 MG/ML
10 INJECTION, SOLUTION INFILTRATION; PERINEURAL ONCE
Status: DISCONTINUED | OUTPATIENT
Start: 2021-07-05 | End: 2021-07-05

## 2021-07-05 RX ORDER — METHYLERGONOVINE MALEATE 0.2 MG/ML
200 INJECTION INTRAVENOUS ONCE AS NEEDED
Status: DISCONTINUED | OUTPATIENT
Start: 2021-07-05 | End: 2021-07-06 | Stop reason: HOSPADM

## 2021-07-05 RX ORDER — PROMETHAZINE HYDROCHLORIDE 25 MG/1
12.5 TABLET ORAL EVERY 6 HOURS PRN
Status: CANCELLED | OUTPATIENT
Start: 2021-07-05

## 2021-07-05 RX ORDER — FENTANYL CITRATE 50 UG/ML
INJECTION, SOLUTION INTRAMUSCULAR; INTRAVENOUS AS NEEDED
Status: DISCONTINUED | OUTPATIENT
Start: 2021-07-05 | End: 2021-07-05 | Stop reason: SURG

## 2021-07-05 RX ORDER — TERBUTALINE SULFATE 1 MG/ML
0.25 INJECTION, SOLUTION SUBCUTANEOUS AS NEEDED
Status: DISCONTINUED | OUTPATIENT
Start: 2021-07-05 | End: 2021-07-06 | Stop reason: HOSPADM

## 2021-07-05 RX ORDER — PHENYLEPHRINE HCL IN 0.9% NACL 0.8MG/10ML
SYRINGE (ML) INTRAVENOUS AS NEEDED
Status: DISCONTINUED | OUTPATIENT
Start: 2021-07-05 | End: 2021-07-05 | Stop reason: SURG

## 2021-07-05 RX ORDER — LACTULOSE 20 G/30ML
20 SOLUTION ORAL DAILY
Status: DISCONTINUED | OUTPATIENT
Start: 2021-07-06 | End: 2021-07-08 | Stop reason: HOSPADM

## 2021-07-05 RX ORDER — CALCIUM CARBONATE 200(500)MG
2 TABLET,CHEWABLE ORAL 3 TIMES DAILY PRN
Status: CANCELLED | OUTPATIENT
Start: 2021-07-05

## 2021-07-05 RX ORDER — CEFAZOLIN SODIUM 1 G/3ML
INJECTION, POWDER, FOR SOLUTION INTRAMUSCULAR; INTRAVENOUS AS NEEDED
Status: DISCONTINUED | OUTPATIENT
Start: 2021-07-05 | End: 2021-07-05 | Stop reason: SURG

## 2021-07-05 RX ORDER — LIDOCAINE HYDROCHLORIDE 20 MG/ML
10 INJECTION, SOLUTION INFILTRATION; PERINEURAL ONCE
Status: COMPLETED | OUTPATIENT
Start: 2021-07-05 | End: 2021-07-05

## 2021-07-05 RX ORDER — MORPHINE SULFATE 10 MG/ML
10 INJECTION INTRAMUSCULAR; INTRAVENOUS; SUBCUTANEOUS
Status: DISCONTINUED | OUTPATIENT
Start: 2021-07-05 | End: 2021-07-06 | Stop reason: HOSPADM

## 2021-07-05 RX ORDER — PROMETHAZINE HYDROCHLORIDE 12.5 MG/1
12.5 SUPPOSITORY RECTAL EVERY 6 HOURS PRN
Status: CANCELLED | OUTPATIENT
Start: 2021-07-05

## 2021-07-05 RX ORDER — SODIUM CHLORIDE 0.9 % (FLUSH) 0.9 %
10 SYRINGE (ML) INJECTION EVERY 12 HOURS SCHEDULED
Status: DISCONTINUED | OUTPATIENT
Start: 2021-07-05 | End: 2021-07-06

## 2021-07-05 RX ORDER — LIDOCAINE HYDROCHLORIDE 20 MG/ML
60 INJECTION, SOLUTION INFILTRATION; PERINEURAL AS NEEDED
Status: DISCONTINUED | OUTPATIENT
Start: 2021-07-05 | End: 2021-07-06

## 2021-07-05 RX ORDER — SUCCINYLCHOLINE/SOD CL,ISO/PF 200MG/10ML
SYRINGE (ML) INTRAVENOUS AS NEEDED
Status: DISCONTINUED | OUTPATIENT
Start: 2021-07-05 | End: 2021-07-05 | Stop reason: SURG

## 2021-07-05 RX ORDER — ACETAMINOPHEN 325 MG/1
650 TABLET ORAL EVERY 4 HOURS PRN
Status: CANCELLED | OUTPATIENT
Start: 2021-07-05

## 2021-07-05 RX ORDER — LIDOCAINE HYDROCHLORIDE 20 MG/ML
INJECTION, SOLUTION EPIDURAL; INFILTRATION; INTRACAUDAL; PERINEURAL AS NEEDED
Status: DISCONTINUED | OUTPATIENT
Start: 2021-07-05 | End: 2021-07-05 | Stop reason: SURG

## 2021-07-05 RX ORDER — ACETAMINOPHEN 325 MG/1
650 TABLET ORAL EVERY 4 HOURS PRN
Status: DISCONTINUED | OUTPATIENT
Start: 2021-07-05 | End: 2021-07-06 | Stop reason: HOSPADM

## 2021-07-05 RX ORDER — ONDANSETRON 2 MG/ML
4 INJECTION INTRAMUSCULAR; INTRAVENOUS EVERY 6 HOURS PRN
Status: CANCELLED | OUTPATIENT
Start: 2021-07-05

## 2021-07-05 RX ORDER — ONDANSETRON 2 MG/ML
INJECTION INTRAMUSCULAR; INTRAVENOUS AS NEEDED
Status: DISCONTINUED | OUTPATIENT
Start: 2021-07-05 | End: 2021-07-05 | Stop reason: SURG

## 2021-07-05 RX ORDER — OXYTOCIN/0.9 % SODIUM CHLORIDE 30/500 ML
999 PLASTIC BAG, INJECTION (ML) INTRAVENOUS ONCE
Status: COMPLETED | OUTPATIENT
Start: 2021-07-05 | End: 2021-07-05

## 2021-07-05 RX ORDER — LIDOCAINE HYDROCHLORIDE AND EPINEPHRINE 20; 5 MG/ML; UG/ML
20 INJECTION, SOLUTION EPIDURAL; INFILTRATION; INTRACAUDAL; PERINEURAL ONCE
Status: DISCONTINUED | OUTPATIENT
Start: 2021-07-05 | End: 2021-07-06

## 2021-07-05 RX ORDER — FAMOTIDINE 20 MG/1
20 TABLET, FILM COATED ORAL ONCE AS NEEDED
Status: CANCELLED | OUTPATIENT
Start: 2021-07-05

## 2021-07-05 RX ORDER — SODIUM CHLORIDE, SODIUM LACTATE, POTASSIUM CHLORIDE, CALCIUM CHLORIDE 600; 310; 30; 20 MG/100ML; MG/100ML; MG/100ML; MG/100ML
125 INJECTION, SOLUTION INTRAVENOUS CONTINUOUS
Status: DISCONTINUED | OUTPATIENT
Start: 2021-07-05 | End: 2021-07-06

## 2021-07-05 RX ORDER — ONDANSETRON 4 MG/1
4 TABLET, FILM COATED ORAL EVERY 6 HOURS PRN
Status: DISCONTINUED | OUTPATIENT
Start: 2021-07-05 | End: 2021-07-06 | Stop reason: HOSPADM

## 2021-07-05 RX ORDER — CARBOPROST TROMETHAMINE 250 UG/ML
250 INJECTION, SOLUTION INTRAMUSCULAR AS NEEDED
Status: DISCONTINUED | OUTPATIENT
Start: 2021-07-05 | End: 2021-07-06 | Stop reason: HOSPADM

## 2021-07-05 RX ORDER — EPHEDRINE SULFATE 50 MG/ML
INJECTION, SOLUTION INTRAVENOUS AS NEEDED
Status: DISCONTINUED | OUTPATIENT
Start: 2021-07-05 | End: 2021-07-05 | Stop reason: SURG

## 2021-07-05 RX ORDER — METHYLERGONOVINE MALEATE 0.2 MG/ML
INJECTION INTRAVENOUS AS NEEDED
Status: DISCONTINUED | OUTPATIENT
Start: 2021-07-05 | End: 2021-07-05 | Stop reason: SURG

## 2021-07-05 RX ORDER — SODIUM CHLORIDE, SODIUM LACTATE, POTASSIUM CHLORIDE, CALCIUM CHLORIDE 600; 310; 30; 20 MG/100ML; MG/100ML; MG/100ML; MG/100ML
INJECTION, SOLUTION INTRAVENOUS CONTINUOUS PRN
Status: DISCONTINUED | OUTPATIENT
Start: 2021-07-05 | End: 2021-07-05 | Stop reason: SURG

## 2021-07-05 RX ORDER — PROMETHAZINE HYDROCHLORIDE 12.5 MG/1
12.5 SUPPOSITORY RECTAL EVERY 6 HOURS PRN
Status: DISCONTINUED | OUTPATIENT
Start: 2021-07-05 | End: 2021-07-06 | Stop reason: HOSPADM

## 2021-07-05 RX ORDER — OXYTOCIN/0.9 % SODIUM CHLORIDE 30/500 ML
125 PLASTIC BAG, INJECTION (ML) INTRAVENOUS CONTINUOUS PRN
Status: COMPLETED | OUTPATIENT
Start: 2021-07-05 | End: 2021-07-05

## 2021-07-05 RX ORDER — PROPOFOL 10 MG/ML
VIAL (ML) INTRAVENOUS AS NEEDED
Status: DISCONTINUED | OUTPATIENT
Start: 2021-07-05 | End: 2021-07-05 | Stop reason: SURG

## 2021-07-05 RX ORDER — PROMETHAZINE HYDROCHLORIDE 25 MG/1
12.5 TABLET ORAL EVERY 6 HOURS PRN
Status: DISCONTINUED | OUTPATIENT
Start: 2021-07-05 | End: 2021-07-06 | Stop reason: HOSPADM

## 2021-07-05 RX ORDER — FAMOTIDINE 10 MG/ML
20 INJECTION, SOLUTION INTRAVENOUS ONCE AS NEEDED
Status: CANCELLED | OUTPATIENT
Start: 2021-07-05

## 2021-07-05 RX ORDER — SODIUM CHLORIDE 0.9 % (FLUSH) 0.9 %
10 SYRINGE (ML) INJECTION AS NEEDED
Status: DISCONTINUED | OUTPATIENT
Start: 2021-07-05 | End: 2021-07-06

## 2021-07-05 RX ORDER — MISOPROSTOL 200 UG/1
800 TABLET ORAL AS NEEDED
Status: DISCONTINUED | OUTPATIENT
Start: 2021-07-05 | End: 2021-07-06 | Stop reason: HOSPADM

## 2021-07-05 RX ORDER — ONDANSETRON 2 MG/ML
4 INJECTION INTRAMUSCULAR; INTRAVENOUS EVERY 6 HOURS PRN
Status: DISCONTINUED | OUTPATIENT
Start: 2021-07-05 | End: 2021-07-06 | Stop reason: HOSPADM

## 2021-07-05 RX ORDER — ONDANSETRON 4 MG/1
4 TABLET, FILM COATED ORAL EVERY 6 HOURS PRN
Status: CANCELLED | OUTPATIENT
Start: 2021-07-05

## 2021-07-05 RX ORDER — OXYTOCIN/0.9 % SODIUM CHLORIDE 30/500 ML
250 PLASTIC BAG, INJECTION (ML) INTRAVENOUS CONTINUOUS
Status: DISPENSED | OUTPATIENT
Start: 2021-07-05 | End: 2021-07-05

## 2021-07-05 RX ADMIN — OXYTOCIN-SODIUM CHLORIDE 0.9% IV SOLN 30 UNIT/500ML 125 ML/HR: 30-0.9/5 SOLUTION at 23:40

## 2021-07-05 RX ADMIN — SODIUM CHLORIDE, POTASSIUM CHLORIDE, SODIUM LACTATE AND CALCIUM CHLORIDE 1000 ML: 600; 310; 30; 20 INJECTION, SOLUTION INTRAVENOUS at 18:04

## 2021-07-05 RX ADMIN — Medication 160 MCG: at 22:22

## 2021-07-05 RX ADMIN — MORPHINE SULFATE 10 MG: 10 INJECTION, SOLUTION INTRAMUSCULAR; INTRAVENOUS at 20:27

## 2021-07-05 RX ADMIN — METHYLERGONOVINE MALEATE 200 MCG: 0.2 INJECTION, SOLUTION INTRAMUSCULAR; INTRAVENOUS at 22:39

## 2021-07-05 RX ADMIN — FENTANYL CITRATE 100 MCG: 50 INJECTION INTRAMUSCULAR; INTRAVENOUS at 21:56

## 2021-07-05 RX ADMIN — CEFAZOLIN 2 G: 330 INJECTION, POWDER, FOR SOLUTION INTRAMUSCULAR; INTRAVENOUS at 22:02

## 2021-07-05 RX ADMIN — SODIUM CHLORIDE, POTASSIUM CHLORIDE, SODIUM LACTATE AND CALCIUM CHLORIDE 125 ML/HR: 600; 310; 30; 20 INJECTION, SOLUTION INTRAVENOUS at 18:54

## 2021-07-05 RX ADMIN — SODIUM CHLORIDE, POTASSIUM CHLORIDE, SODIUM LACTATE AND CALCIUM CHLORIDE: 600; 310; 30; 20 INJECTION, SOLUTION INTRAVENOUS at 21:56

## 2021-07-05 RX ADMIN — ONDANSETRON 4 MG: 2 INJECTION INTRAMUSCULAR; INTRAVENOUS at 20:56

## 2021-07-05 RX ADMIN — LIDOCAINE HYDROCHLORIDE 100 MG: 20 INJECTION, SOLUTION EPIDURAL; INFILTRATION; INTRACAUDAL; PERINEURAL at 21:56

## 2021-07-05 RX ADMIN — OXYTOCIN-SODIUM CHLORIDE 0.9% IV SOLN 30 UNIT/500ML 999 ML/HR: 30-0.9/5 SOLUTION at 21:31

## 2021-07-05 RX ADMIN — FENTANYL CITRATE 50 MCG: 50 INJECTION INTRAMUSCULAR; INTRAVENOUS at 22:43

## 2021-07-05 RX ADMIN — PROPOFOL 170 MG: 10 INJECTION, EMULSION INTRAVENOUS at 21:56

## 2021-07-05 RX ADMIN — ONDANSETRON 4 MG: 2 INJECTION INTRAMUSCULAR; INTRAVENOUS at 22:43

## 2021-07-05 RX ADMIN — LIDOCAINE HYDROCHLORIDE 10 ML: 20 INJECTION, SOLUTION INFILTRATION; PERINEURAL at 21:14

## 2021-07-05 RX ADMIN — GLYCOPYRROLATE 0.3 MG: 0.2 INJECTION, SOLUTION INTRAMUSCULAR; INTRAVENOUS at 22:25

## 2021-07-05 RX ADMIN — EPHEDRINE SULFATE 15 MG: 50 INJECTION INTRAVENOUS at 22:20

## 2021-07-05 RX ADMIN — EPHEDRINE SULFATE 15 MG: 50 INJECTION INTRAVENOUS at 22:07

## 2021-07-05 RX ADMIN — LIDOCAINE HYDROCHLORIDE 20 ML: 20 INJECTION, SOLUTION INFILTRATION; PERINEURAL at 21:23

## 2021-07-05 RX ADMIN — CONJUGATED ESTROGENS 2 G: 0.62 CREAM VAGINAL at 23:05

## 2021-07-05 RX ADMIN — Medication 160 MG: at 21:56

## 2021-07-05 RX ADMIN — FENTANYL CITRATE 100 MCG: 50 INJECTION INTRAMUSCULAR; INTRAVENOUS at 22:30

## 2021-07-06 LAB
BASOPHILS # BLD AUTO: 0.05 10*3/MM3 (ref 0–0.2)
BASOPHILS NFR BLD AUTO: 0.4 % (ref 0–1.5)
DEPRECATED RDW RBC AUTO: 46.1 FL (ref 37–54)
EOSINOPHIL # BLD AUTO: 0.01 10*3/MM3 (ref 0–0.4)
EOSINOPHIL NFR BLD AUTO: 0.1 % (ref 0.3–6.2)
ERYTHROCYTE [DISTWIDTH] IN BLOOD BY AUTOMATED COUNT: 13.6 % (ref 12.3–15.4)
HCT VFR BLD AUTO: 30.6 % (ref 34–46.6)
HGB BLD-MCNC: 10.3 G/DL (ref 12–15.9)
IMM GRANULOCYTES # BLD AUTO: 0.16 10*3/MM3 (ref 0–0.05)
IMM GRANULOCYTES NFR BLD AUTO: 1.2 % (ref 0–0.5)
LYMPHOCYTES # BLD AUTO: 3.06 10*3/MM3 (ref 0.7–3.1)
LYMPHOCYTES NFR BLD AUTO: 22.4 % (ref 19.6–45.3)
MCH RBC QN AUTO: 30.7 PG (ref 26.6–33)
MCHC RBC AUTO-ENTMCNC: 33.7 G/DL (ref 31.5–35.7)
MCV RBC AUTO: 91.3 FL (ref 79–97)
MONOCYTES # BLD AUTO: 0.62 10*3/MM3 (ref 0.1–0.9)
MONOCYTES NFR BLD AUTO: 4.5 % (ref 5–12)
NEUTROPHILS NFR BLD AUTO: 71.4 % (ref 42.7–76)
NEUTROPHILS NFR BLD AUTO: 9.79 10*3/MM3 (ref 1.7–7)
NRBC BLD AUTO-RTO: 0 /100 WBC (ref 0–0.2)
PLATELET # BLD AUTO: 159 10*3/MM3 (ref 140–450)
PMV BLD AUTO: 11 FL (ref 6–12)
RBC # BLD AUTO: 3.35 10*6/MM3 (ref 3.77–5.28)
WBC # BLD AUTO: 13.69 10*3/MM3 (ref 3.4–10.8)

## 2021-07-06 PROCEDURE — 85025 COMPLETE CBC W/AUTO DIFF WBC: CPT | Performed by: OBSTETRICS & GYNECOLOGY

## 2021-07-06 PROCEDURE — 51702 INSERT TEMP BLADDER CATH: CPT

## 2021-07-06 RX ORDER — OXYCODONE AND ACETAMINOPHEN 10; 325 MG/1; MG/1
1 TABLET ORAL EVERY 4 HOURS PRN
Status: DISCONTINUED | OUTPATIENT
Start: 2021-07-06 | End: 2021-07-08 | Stop reason: HOSPADM

## 2021-07-06 RX ORDER — MISOPROSTOL 200 UG/1
600 TABLET ORAL ONCE
Status: DISCONTINUED | OUTPATIENT
Start: 2021-07-06 | End: 2021-07-08 | Stop reason: HOSPADM

## 2021-07-06 RX ORDER — PROMETHAZINE HYDROCHLORIDE 12.5 MG/1
12.5 SUPPOSITORY RECTAL EVERY 6 HOURS PRN
Status: DISCONTINUED | OUTPATIENT
Start: 2021-07-06 | End: 2021-07-08 | Stop reason: HOSPADM

## 2021-07-06 RX ORDER — IBUPROFEN 600 MG/1
600 TABLET ORAL EVERY 8 HOURS PRN
Status: DISCONTINUED | OUTPATIENT
Start: 2021-07-06 | End: 2021-07-08 | Stop reason: HOSPADM

## 2021-07-06 RX ORDER — OXYCODONE HYDROCHLORIDE AND ACETAMINOPHEN 5; 325 MG/1; MG/1
1 TABLET ORAL EVERY 4 HOURS PRN
Status: DISCONTINUED | OUTPATIENT
Start: 2021-07-06 | End: 2021-07-08 | Stop reason: HOSPADM

## 2021-07-06 RX ORDER — METHYLERGONOVINE MALEATE 0.2 MG/ML
200 INJECTION INTRAVENOUS ONCE
Status: DISCONTINUED | OUTPATIENT
Start: 2021-07-06 | End: 2021-07-08 | Stop reason: HOSPADM

## 2021-07-06 RX ORDER — ONDANSETRON 4 MG/1
4 TABLET, FILM COATED ORAL EVERY 8 HOURS PRN
Status: DISCONTINUED | OUTPATIENT
Start: 2021-07-06 | End: 2021-07-08 | Stop reason: HOSPADM

## 2021-07-06 RX ORDER — PROMETHAZINE HYDROCHLORIDE 25 MG/1
25 TABLET ORAL EVERY 6 HOURS PRN
Status: DISCONTINUED | OUTPATIENT
Start: 2021-07-06 | End: 2021-07-08 | Stop reason: HOSPADM

## 2021-07-06 RX ORDER — BISACODYL 10 MG
10 SUPPOSITORY, RECTAL RECTAL DAILY PRN
Status: DISCONTINUED | OUTPATIENT
Start: 2021-07-06 | End: 2021-07-08 | Stop reason: HOSPADM

## 2021-07-06 RX ORDER — CALCIUM CARBONATE 200(500)MG
2 TABLET,CHEWABLE ORAL 3 TIMES DAILY PRN
Status: DISCONTINUED | OUTPATIENT
Start: 2021-07-06 | End: 2021-07-08 | Stop reason: HOSPADM

## 2021-07-06 RX ORDER — HYDROCORTISONE 25 MG/G
1 CREAM TOPICAL AS NEEDED
Status: DISCONTINUED | OUTPATIENT
Start: 2021-07-06 | End: 2021-07-08 | Stop reason: HOSPADM

## 2021-07-06 RX ORDER — MORPHINE SULFATE 2 MG/ML
1 INJECTION, SOLUTION INTRAMUSCULAR; INTRAVENOUS EVERY 4 HOURS PRN
Status: DISCONTINUED | OUTPATIENT
Start: 2021-07-06 | End: 2021-07-08 | Stop reason: HOSPADM

## 2021-07-06 RX ORDER — OXYCODONE HYDROCHLORIDE AND ACETAMINOPHEN 5; 325 MG/1; MG/1
2 TABLET ORAL EVERY 4 HOURS PRN
Status: DISCONTINUED | OUTPATIENT
Start: 2021-07-06 | End: 2021-07-06 | Stop reason: HOSPADM

## 2021-07-06 RX ORDER — NALOXONE HCL 0.4 MG/ML
0.4 VIAL (ML) INJECTION
Status: DISCONTINUED | OUTPATIENT
Start: 2021-07-06 | End: 2021-07-08 | Stop reason: HOSPADM

## 2021-07-06 RX ORDER — ONDANSETRON 2 MG/ML
4 INJECTION INTRAMUSCULAR; INTRAVENOUS EVERY 6 HOURS PRN
Status: DISCONTINUED | OUTPATIENT
Start: 2021-07-06 | End: 2021-07-08 | Stop reason: HOSPADM

## 2021-07-06 RX ORDER — CARBOPROST TROMETHAMINE 250 UG/ML
250 INJECTION, SOLUTION INTRAMUSCULAR ONCE
Status: DISCONTINUED | OUTPATIENT
Start: 2021-07-06 | End: 2021-07-08 | Stop reason: HOSPADM

## 2021-07-06 RX ORDER — SODIUM CHLORIDE 0.9 % (FLUSH) 0.9 %
1-10 SYRINGE (ML) INJECTION AS NEEDED
Status: DISCONTINUED | OUTPATIENT
Start: 2021-07-06 | End: 2021-07-08 | Stop reason: HOSPADM

## 2021-07-06 RX ADMIN — OXYCODONE HYDROCHLORIDE AND ACETAMINOPHEN 1 TABLET: 5; 325 TABLET ORAL at 21:16

## 2021-07-06 RX ADMIN — OXYCODONE HYDROCHLORIDE AND ACETAMINOPHEN 1 TABLET: 5; 325 TABLET ORAL at 06:32

## 2021-07-06 RX ADMIN — LACTULOSE 20 G: 20 SOLUTION ORAL at 08:24

## 2021-07-06 RX ADMIN — OXYCODONE HYDROCHLORIDE AND ACETAMINOPHEN 2 TABLET: 5; 325 TABLET ORAL at 01:07

## 2021-07-06 RX ADMIN — Medication: at 06:32

## 2021-07-06 RX ADMIN — IBUPROFEN 600 MG: 600 TABLET, FILM COATED ORAL at 16:56

## 2021-07-06 RX ADMIN — OXYCODONE HYDROCHLORIDE AND ACETAMINOPHEN 1 TABLET: 5; 325 TABLET ORAL at 16:56

## 2021-07-06 RX ADMIN — OXYCODONE HYDROCHLORIDE AND ACETAMINOPHEN 1 TABLET: 5; 325 TABLET ORAL at 12:45

## 2021-07-06 NOTE — PLAN OF CARE
Goal Outcome Evaluation:  Plan of Care Reviewed With: patient, spouse        Progress: improving   VSS, FF/ML/U1, scant lochia, urethral catheter in place with adequate output, pt has not complained of any pain while being on the unit, using waffle cushion and tucks pads and refused ice packs to perineum, patient does not eat meat or eggs, breast pump to bed, responsive to infant cues

## 2021-07-06 NOTE — PLAN OF CARE
Problem: Adult Inpatient Plan of Care  Goal: Plan of Care Review  Outcome: Ongoing, Progressing  Flowsheets (Taken 7/6/2021 1733)  Progress: improving  Plan of Care Reviewed With: patient  Outcome Summary: VSS WNL, fundus fml U1 scant lochia, perineum tender.  ni catheter to remain until tomorrow.  Great output.  ambulated room and sat in chair for a while today.  Started taking the ibuprofen along with the percocet.   Goal Outcome Evaluation:  Plan of Care Reviewed With: patient        Progress: improving  Outcome Summary: VSS WNL, fundus fml U1 scant lochia, perineum tender.  ni catheter to remain until tomorrow.  Great output.  ambulated room and sat in chair for a while today.  Started taking the ibuprofen along with the percocet.

## 2021-07-06 NOTE — PROGRESS NOTES
"[facility]  Vaginal Delivery Progress Note    Subjective   Postpartum Day 1: Vaginal Delivery    The patient feels tired.  Her pain is moderately controlled with ibuprofen (OTC) and Percocet.   She is not ambulating well, but has not been on this unit for very long.  Patient describes her bleeding as moderate lochia.      Objective     Vital Signs Range for the last 24 hours  Temperature: Temp:  [97 °F (36.1 °C)-98.3 °F (36.8 °C)] 97.7 °F (36.5 °C)   Temp Source: Temp src: Oral   BP: BP: ()/(58-87) 100/58   Pulse: Heart Rate:  [] 84   Respirations: Resp:  [16-20] 16   SPO2: SpO2:  [98 %-100 %] 98 %   O2 Amount (l/min):     O2 Devices Device (Oxygen Therapy): room air   Weight: Weight:  [73.9 kg (163 lb)] 73.9 kg (163 lb)     Admit Height:  Height: 157.5 cm (62\")      Physical Exam:  General:  no acute distresss.  Lungs:  breathing is unlabored  Abdomen: Fundus: appropriate, firm, non tender  Extremities: normal, atraumatic, no cyanosis, and Negative edema.     Lab results reviewed:  Yes   Rubella:  No results found for: RUBELLAIGGIN Nurse Transcribed from prenatal record --  No components found for: EXTRUBELQUAL  Rh Status:    RH type   Date Value Ref Range Status   07/05/2021 Positive  Final     Immunizations:   Immunization History   Administered Date(s) Administered   • Tdap 04/23/2021     Lab Results (last 24 hours)     Procedure Component Value Units Date/Time    CBC & Differential [[id number]]  (Abnormal) Collected: 07/06/21 0338    Specimen: Blood Updated: 07/06/21 0502    Narrative:      The following orders were created for panel order CBC & Differential.  Procedure                               Abnormality         Status                     ---------                               -----------         ------                     CBC Auto Differential[[id number]]        Abnormal            Final result                 Please view results for these tests on the individual orders.    CBC Auto " Differential [[id number]]  (Abnormal) Collected: 07/06/21 0338    Specimen: Blood Updated: 07/06/21 0502     WBC 13.69 10*3/mm3      RBC 3.35 10*6/mm3      Hemoglobin 10.3 g/dL      Hematocrit 30.6 %      MCV 91.3 fL      MCH 30.7 pg      MCHC 33.7 g/dL      RDW 13.6 %      RDW-SD 46.1 fl      MPV 11.0 fL      Platelets 159 10*3/mm3      Neutrophil % 71.4 %      Lymphocyte % 22.4 %      Monocyte % 4.5 %      Eosinophil % 0.1 %      Basophil % 0.4 %      Immature Grans % 1.2 %      Neutrophils, Absolute 9.79 10*3/mm3      Lymphocytes, Absolute 3.06 10*3/mm3      Monocytes, Absolute 0.62 10*3/mm3      Eosinophils, Absolute 0.01 10*3/mm3      Basophils, Absolute 0.05 10*3/mm3      Immature Grans, Absolute 0.16 10*3/mm3      nRBC 0.0 /100 WBC     Hepatitis B Surface Antigen [[id number]] Resulted: 11/17/20    Specimen: Blood Updated: 07/05/21 1949     External Hepatitis B Surface Ag Negative    RPR [[id number]] Resulted: 11/17/20    Specimen: Blood Updated: 07/05/21 1949     External RPR Non-Reactive    Rubella Antibody, IgG [[id number]] Resulted: 11/17/20    Specimen: Blood Updated: 07/05/21 1949     External Rubella Qual Immune    HIV-1 Antibody, EIA [[id number]] Resulted: 11/17/20    Specimen: Blood Updated: 07/05/21 1949     External HIV Antibody Non-Reactive    HSV 1/2, PCR - , [[id number]] Resulted: 11/17/20     Updated: 07/05/21 1949     Herpes PCR Negative 1&2    CBC & Differential [[id number]]  (Abnormal) Collected: 07/05/21 1806    Specimen: Blood Updated: 07/05/21 1813    Narrative:      The following orders were created for panel order CBC & Differential.  Procedure                               Abnormality         Status                     ---------                               -----------         ------                     CBC Auto Differential[[id number]]        Abnormal            Final result                 Please view results for these tests on the individual orders.    CBC Auto Differential [[id number]]   (Abnormal) Collected: 07/05/21 1806    Specimen: Blood Updated: 07/05/21 1813     WBC 11.76 10*3/mm3      RBC 3.79 10*6/mm3      Hemoglobin 11.7 g/dL      Hematocrit 33.9 %      MCV 89.4 fL      MCH 30.9 pg      MCHC 34.5 g/dL      RDW 13.7 %      RDW-SD 44.5 fl      MPV 10.7 fL      Platelets 177 10*3/mm3      Neutrophil % 61.9 %      Lymphocyte % 28.3 %      Monocyte % 6.9 %      Eosinophil % 0.6 %      Basophil % 0.4 %      Immature Grans % 1.9 %      Neutrophils, Absolute 7.28 10*3/mm3      Lymphocytes, Absolute 3.33 10*3/mm3      Monocytes, Absolute 0.81 10*3/mm3      Eosinophils, Absolute 0.07 10*3/mm3      Basophils, Absolute 0.05 10*3/mm3      Immature Grans, Absolute 0.22 10*3/mm3      nRBC 0.0 /100 WBC     Urinalysis With Microscopic If Indicated (No Culture) - Urine, Clean Catch [[id number]]  (Abnormal) Collected: 07/05/21 1611    Specimen: Urine, Clean Catch Updated: 07/05/21 1634     Color, UA Yellow     Appearance, UA Clear     pH, UA 6.5     Specific Gravity, UA <=1.005     Glucose, UA Negative     Ketones, UA Negative     Bilirubin, UA Negative     Blood, UA Negative     Protein, UA Negative     Leuk Esterase, UA Small (1+)     Nitrite, UA Negative     Urobilinogen, UA 0.2 E.U./dL    Urinalysis, Microscopic Only - Urine, Clean Catch [[id number]]  (Abnormal) Collected: 07/05/21 1611    Specimen: Urine, Clean Catch Updated: 07/05/21 1634     RBC, UA 3-5 /HPF      WBC, UA 6-12 /HPF      Bacteria, UA None Seen /HPF      Squamous Epithelial Cells, UA 0-2 /HPF      Hyaline Casts, UA 0-2 /LPF      Methodology Automated Microscopy          Assessment/Plan       * No active hospital problems. *      Yajaira Doss is Day 1  post-partum  Vaginal, Spontaneous  midline 3rd degree laceration - repaired .      Plan:  Continue current care.  Leaving ni catheter in until tomorrow.  Laceration and repair discussed.  Patient strongly encouraged to consider C/S for future deliveries.      Elodia Hyatt,  MD  7/6/2021  07:23 CDT

## 2021-07-06 NOTE — L&D DELIVERY NOTE
[facility]  Vaginal Delivery Note    Delivery     Delivery: Vaginal, Spontaneous     YOB: 2021    Time of Birth:  Gestational Age 9:25 PM   40w4d     Anesthesia: Local     Delivering clinician: Elodia Hyatt    Forceps?   No   Vacuum? No    Shoulder dystocia present: No        Delivery narrative:  Patient progressed quickly to complete, without an epidural.  She pushed for only 20 minutes to deliver viable female infant, OA, over extensive 3rd degree laceration.  I could tell the patient was pushing that she had extremely thin perineum with a dense band of scar tissue -both the result of a previous obstetrical laceration repair.  As the head delivered, I tried to support the perineum, but her entire previous laceration repair reopened.  In anticipation of this, I had injected the perineum with 2% lidocaine prior to delivery, but the patient was still exquisitely tender since she did not have an epidural.  Just the pressure of the loose hanging umbilical cord caused the patient to grimace, and the decision was made to take her to the operating room for repair.    Patient was moved to the operating room where general anesthesia was administered.  She was then placed in candycane stirrups and a Wiseman catheter placed.  The patient was carefully examined and found to have intact rectal mucosa, although her sphincter muscle had torn all the way through.  In addition to being full-thickness through the muscle, the patient's laceration extended far up into the vagina, becoming a Y which tore following both the right and left vaginal sulcus.  3-0 Vicryl suture was used for all layers of the repair.  Attention was first turned to the sulcus tears which were each reapproximated with 4 sutures; this was done first just to help hold tissue in a symmetrical orientation because it was so disfigured.  Attention was then turned to reapproximation of the patient's internal and external sphincter.  Muscles  reapproximated with multiple layers of interrupted sutures; great care was taken to make sure that the repair remained symmetrical through all layers.  Finally, more superficial tissue was reapproximated in the typical fashion.  At the conclusion of the case, the patient's vagina was packed with vaginal packing material that was soaked in estrogen cream.  The nurse had instructions to leave this in place for 2 hours and then pull the packing material out.  The patient's Wiseman catheter will stay in place until Thursday.    Infant    Findings: female  infant     Infant observations: Weight: 3090 g (6 lb 13 oz)   Length: 20  in  Observations/Comments:  33cm      Apgars: 8  @ 1 minute /    9  @ 5 minutes   Infant Name: undecided     Placenta, Cord, and Fluid    Placenta delivered  Spontaneous  at   7/5/2021 10:09 PM     Cord:   present.   Nuchal Cord?  no   Cord blood obtained: No    Cord gases obtained:  Yes    Cord gas results: Venous:  No results found for: PHCVEN    Arterial:  No results found for: PHCART     Repair    Episiotomy: None     No    Lacerations: Yes  Laceration Information  Laceration Repaired?   Perineal: 3rd  Yes    Periurethral:       Labial:       Sulcus:       Vaginal:       Cervical:         Suture used for repair: 3-0 Vicryl   Estimated Blood Loss:  200 ml           Complications  none    Disposition  Mother to Mother Baby/Postpartum  in stable condition currently.  Baby to remains with mom  in stable condition currently.      Elodia Hyatt MD  07/05/21  23:18 CDT

## 2021-07-06 NOTE — LACTATION NOTE
Mother's Name: Yajaira Doss  Phone #: [phone]  Infant Name: unsure  : 21  Gestation: 40w4d  Day of life: 1  Birth weight:  6-13 (3090g)  Discharge weight:  Weight Loss:   24 hour Summary of Feeds: 1 BF + 2 Formula feeds Voids: 1 Stools:  1  Assistive devices (shields, shells, etc):  Significant Maternal history: , Anemia, BF 1st child with difficulty  Maternal Concerns:  Nipple pain and supply  Maternal Goal: breast and formula feed  Mother's Medications:  Colace, FE, PNV  Breastpump for home: Yes new Medela  Ped follow up appt:     Patient reports painful nipples with latching and she feels infant is not getting anything. Patient requested assistance with positioning and latching infant. With assist infant latched well but pulls chin to chest and patient reports pain. Demonstrated breaking latch when painful. Assisted patient to latch infant more independently and infant began nursing and patient reported latch tolerable. Multiple swallows noted, along with deep jaw drops and consistent sucking. Reviewed initial breastfeeding packet and book provided. Discussed signs of a good feeding, signs baby is getting enough, healing sore nipples, and latching deep for maximum transfer and comfort during breastfeeding. Demonstrated ventral and cradle position. Infant nursed 15/15. Offered continued assistance and encouraged pumping when supplementing.     Instructed mom our lactation team is here for continued support throughout their breastfeeding journey. Our team has encouraged mom to call with any questions or concerns that may arise after discharge.

## 2021-07-06 NOTE — ANESTHESIA PROCEDURE NOTES
Airway  Urgency: elective    Date/Time: 7/5/2021 9:57 PM  Airway not difficult    General Information and Staff    Patient location during procedure: OB  CRNA: Nick Mason CRNA    Indications and Patient Condition  Indications for airway management: airway protection    Preoxygenated: yes  Mask difficulty assessment: 0 - not attempted    Final Airway Details  Final airway type: endotracheal airway      Successful airway: ETT  Cuffed: yes   Successful intubation technique: video laryngoscopy  Facilitating devices/methods: intubating stylet, cricoid pressure and anterior pressure/BURP  Endotracheal tube insertion site: oral  Blade: Ash  Blade size: 4  ETT size (mm): 7.0  Cormack-Lehane Classification: grade I - full view of glottis  Placement verified by: chest auscultation, capnometry and palpation of cuff   Cuff volume (mL): 6  Measured from: gums  ETT/EBT to gums (cm): 19  Number of attempts at approach: 1  Assessment: lips, teeth, and gum same as pre-op and atraumatic intubation

## 2021-07-06 NOTE — H&P
" Purvi Bergeron  : 1990  MRN: [id number]  CSN: [id number]    History and Physical    Subjective   Yajaira Bergeron is a 30 y.o. year old  with an Estimated Date of Delivery: 21 currently at 40w4d presenting with brown discharge and increased frequency of urination.  UA negative for infection, but patient's contractions have progressively become more painful as the afternoon/evening has progressed.  She initially rated contractions 3/10, but now says they are 8/10 and she is breathing through them.  Patient reports h/o uncomplicated  X 1.    Prenatal care has been with Dr. Sage Ndiaye.  It has been benign.    OB History    Para Term  AB Living   2 1 1 0 0 1   SAB TAB Ectopic Molar Multiple Live Births   0 0 0 0 0 1      # Outcome Date GA Lbr [place]/2nd Weight Sex Delivery Anes PTL Lv   2 Current            1 Term 18 40w3d 08:28 / 01:55 2940 g (6 lb 7.7 oz) F Vag-Spont EPI N TERRA      Birth Comments: hc 33 cm       Name: KINGA BERGERON      Apgar1: 8  Apgar5: 9     Past Medical History:   Diagnosis Date   • Anemia      No past surgical history on file.    Current Facility-Administered Medications:   •  lactated ringers infusion, 125 mL/hr, Intravenous, Continuous, Elodia Hyatt MD, Last Rate: 125 mL/hr at 21 1854, 125 mL/hr at 21 1854  •  sodium chloride 0.9 % flush 10 mL, 10 mL, Intravenous, Q12H, Elodia Hyatt MD  •  sodium chloride 0.9 % flush 10 mL, 10 mL, Intravenous, PRN, Elodia Hyatt MD    No Known Allergies  Social History    Tobacco Use      Smoking status: Never Smoker      Smokeless tobacco: Never Used    Review of Systems      Objective   /67   Pulse 78   Temp 97 °F (36.1 °C) (Temporal)   Resp 18   Ht 157.5 cm (62\")   Wt 73.9 kg (163 lb)   BMI 29.81 kg/m²   General: well developed; well nourished  no acute distress   Heart: Not performed.   Lungs: breathing is unlabored   Abdomen: soft, non-tender; no masses  fundus " firm and non-tender   FHT's: reactive, reassuring  145 +accels, one late decel several minutes ago, but no other decels, moderate variability   Cervix: was checked (by me): 4-5 cm / 60 % / -3   Presentation: cephalic   Contractions:  EFW by Leopold's:  EFW by recent u/s: regular every 3 minutes           Prenatal Labs  Lab Results   Component Value Date    HGB 11.7 (L) 07/05/2021    HEPBSAG Negative 11/28/2017    ABO AB 07/05/2021    RH Positive 07/05/2021    ABSCRN Negative 07/05/2021    [id number] Non-Reactive 11/28/2017    URINECX Final report 05/27/2021       Current Labs Reviewed   UA and all new OB labs       Assessment   1. IUP at 40w4d in early labor  2. Fetus reassuring  3. Group B strep status: negative     Plan   1. Expectant management   2. Will move to a labor room, and then AROM  3. Patient not planning epidural.  Have advised her that SQ morphine is also an option.    Elodia Hyatt MD  7/5/2021  19:27 CDT

## 2021-07-06 NOTE — ANESTHESIA PREPROCEDURE EVALUATION
Anesthesia Evaluation     NPO Solid Status: > 8 hours  NPO Liquid Status: > 8 hours           Airway   Mallampati: II  TM distance: >3 FB  Neck ROM: full  No difficulty expected  Dental - normal exam     Pulmonary - negative pulmonary ROS and normal exam   Cardiovascular - negative cardio ROS and normal exam  Exercise tolerance: good (4-7 METS)        Neuro/Psych- negative ROS  GI/Hepatic/Renal/Endo - negative ROS     Musculoskeletal (-) negative ROS    Abdominal  - normal exam   Substance History - negative use     OB/GYN negative ob/gyn ROS         Other - negative ROS                       Anesthesia Plan    ASA 2 - emergent     general     intravenous induction     Anesthetic plan, all risks, benefits, and alternatives have been provided, discussed and informed consent has been obtained with: patient.  Use of blood products discussed with patient  Consented to blood products.

## 2021-07-07 RX ADMIN — OXYCODONE HYDROCHLORIDE AND ACETAMINOPHEN 1 TABLET: 5; 325 TABLET ORAL at 15:05

## 2021-07-07 RX ADMIN — OXYCODONE HYDROCHLORIDE AND ACETAMINOPHEN 1 TABLET: 5; 325 TABLET ORAL at 06:39

## 2021-07-07 RX ADMIN — LACTULOSE 20 G: 20 SOLUTION ORAL at 09:54

## 2021-07-07 RX ADMIN — IBUPROFEN 600 MG: 600 TABLET, FILM COATED ORAL at 15:05

## 2021-07-07 RX ADMIN — IBUPROFEN 600 MG: 600 TABLET, FILM COATED ORAL at 06:39

## 2021-07-07 RX ADMIN — SODIUM CHLORIDE, PRESERVATIVE FREE 10 ML: 5 INJECTION INTRAVENOUS at 09:54

## 2021-07-07 NOTE — DISCHARGE INSTR - APPOINTMENTS
You have a postpartum appointment scheduled with Dr. Ndiaye for Tuesday, August 17th at 1:00 PM.        You have an appointment to see Dr. Ndiaye on July 19th at 1:00pm

## 2021-07-07 NOTE — PROGRESS NOTES
"[facility]  Vaginal Delivery Progress Note    Subjective   Postpartum Day 2: Vaginal Delivery    The patient feels well.  Her pain is well controlled with nonsteroidal anti-inflammatory drugs and opioid analgesics.   She is ambulating well.  Patient describes her bleeding as thin lochia.    Breastfeeding: infant latching without difficulty.    Objective     Vital Signs Range for the last 24 hours  Temperature: Temp:  [97.6 °F (36.4 °C)-98.7 °F (37.1 °C)] 97.6 °F (36.4 °C)   Temp Source: Temp src: Oral   BP: BP: (80-99)/(55-62) 92/55   Pulse: Heart Rate:  [92] 92   Respirations: Resp:  [16] 16   SPO2: SpO2:  [97 %-98 %] 98 %   O2 Amount (l/min):     O2 Devices Device (Oxygen Therapy): room air   Weight:       Admit Height:  Height: 157.5 cm (62\")      Physical Exam:  General:  no acute distresss.  Abdomen: abdomen is soft without significant tenderness, masses, organomegaly or guarding. Fundus: appropriate, firm, non tender  Extremities: normal, atraumatic, no cyanosis, and trace edema.     Lab results reviewed:  Yes   Rubella:  No results found for: RUBELLAIGGIN Nurse Transcribed from prenatal record --  No components found for: EXTRUBELQUAL  Rh Status:    RH type   Date Value Ref Range Status   07/05/2021 Positive  Final     Immunizations:   Immunization History   Administered Date(s) Administered   • Tdap 04/23/2021     Lab Results (last 24 hours)     ** No results found for the last 24 hours. **          Assessment/Plan       * No active hospital problems. *      Yajaira Doss is Day 2  post-partum  Vaginal, Spontaneous  midline 3rd degree laceration - repaired .      Plan:  Remove ni catheter.      Lea Zuluaga,   7/7/2021  07:37 CDT      "

## 2021-07-07 NOTE — ANESTHESIA POSTPROCEDURE EVALUATION
"Patient: Yajaira Doss    Procedure Summary     Date: 07/05/21 Room / Location:  [facility] DELIVERY 2 /  [facility]    Anesthesia Start: 2150 Anesthesia Stop: 2324    Procedure: VAGINAL/LABIAL LACERATION REPAIR (Bilateral Vagina) Diagnosis:     Surgeons: Elodia Hyatt MD Provider: Nick Mason CRNA    Anesthesia Type: general ASA Status: 2 - Emergent          Anesthesia Type: general    Vitals  Vitals Value Taken Time   BP 92/55 07/06/21 2150   Temp 97.6 °F (36.4 °C) 07/06/21 2010   Pulse 92 07/06/21 2010   Resp 16 07/06/21 2010   SpO2 98 % 07/06/21 2010           Post Anesthesia Care and Evaluation    Patient location during evaluation: PACU  Patient participation: complete - patient participated  Level of consciousness: awake and alert  Pain management: adequate  Airway patency: patent  Anesthetic complications: No anesthetic complications    Cardiovascular status: acceptable  Respiratory status: acceptable  Hydration status: acceptable  Post Neuraxial Block status: Motor and sensory function returned to baseline and No signs or symptoms of PDPH  Comments: Blood pressure 92/55, pulse 92, temperature 97.6 °F (36.4 °C), temperature source Oral, resp. rate 16, height 157.5 cm (62\"), weight 73.9 kg (163 lb), SpO2 98 %, currently breastfeeding.    Pt discharged from PACU based on norma score >8      "

## 2021-07-07 NOTE — LACTATION NOTE
Mother's Name: Yajaira Doss  Phone #: [phone]  Infant Name: Farrukh   : 21  Gestation: 40w4d  Day of life: 2  Birth weight:  6-13 (3090g)  Discharge weight:  Weight Loss: -5.44%  24 hour Summary of Feeds: 4 BF + 5 Formula feeds Voids: 6 Stools:  6  Assistive devices (shields, shells, etc):  Significant Maternal history: , Anemia, BF 1st child with difficulty  Maternal Concerns:  Nipple pain and supply  Maternal Goal: breast and formula feed  Mother's Medications:  Colace, FE, PNV  Breastpump for home: Yes new Medela  Ped follow up appt:     Patient pumping. She reports L nipple pain improving but decided to do some pumping and give it a break. Encouraged pumping to protect her supply until she is able to return infant to breast for latching. Discussed flange fit and expected output from pumping the first few days. Recommended hand expressing after pumping. Flanges appear to be proper fit at present. Discharge packet provided. Reviewed nipple healing, signs baby is getting enough, and offered assistance as needed.

## 2021-07-07 NOTE — LACTATION NOTE
Mother's Name: Yajaira Doss  Phone #: [phone]  Infant Name: Farrukh   : 21  Gestation: 40w4d  Day of life: 1  Birth weight:  6-13 (3090g)  Discharge weight:  Weight Loss:   24 hour Summary of Feeds: 1 BF + 2 Formula feeds Voids: 1 Stools:  1  Assistive devices (shields, shells, etc):  Significant Maternal history: , Anemia, BF 1st child with difficulty  Maternal Concerns:  Nipple pain and supply  Maternal Goal: breast and formula feed  Mother's Medications:  Colace, FE, PNV  Breastpump for home: Yes new Medela  Ped follow up appt:     Nursing stated patient was feeding infant at this time.  To room to follow-up with mom. Mom feeding infant in cross cradle position.  Mom relates breast are sore from previous nursing.  States she has a blood blister on left breast.  Infant has good deep latch on right breast and is sucking routinely.  Stimulated with touch towards the end of feeding.  Patient states it is slightly uncomfortable at this time.  Repositioned bottom lips to make a wider latch and mom states it is more comfortable.  Mom was anxious to latch infant on the left side because of pain.  Suggested putting lanolin on breast prior to latch.  Assisted mom with positioning and getting a deep latch.  Mom states it is still sore, but tolerable.  After 7 minutes of feeding it became more painful and patient released latch and repositioned infant for a deeper latch again.  Great position and good technique used for latching independently.  Mom praised!  Educated on using lanolin and EBM frequently to help with breast soreness, as well as maintaining a deep latch.  Offered support and assistance as needed throughout the night.

## 2021-07-07 NOTE — PLAN OF CARE
Goal Outcome Evaluation:           Progress: improving  Outcome Summary: VSS, FFMLU1, scant lochia, perineum tender, swelling present, dermoplast used with some relief, Wiseman catheter still in place, cath care provided, output good, ambulating in room, Pain controlled with prn pain medications, SO at bedside, Bonding well with infant, breastfeeding occasionally mostly suppplementing, left nipple sore with blister present

## 2021-07-08 VITALS
BODY MASS INDEX: 30 KG/M2 | OXYGEN SATURATION: 100 % | RESPIRATION RATE: 17 BRPM | HEART RATE: 86 BPM | TEMPERATURE: 97.6 F | HEIGHT: 62 IN | WEIGHT: 163 LBS | DIASTOLIC BLOOD PRESSURE: 61 MMHG | SYSTOLIC BLOOD PRESSURE: 101 MMHG

## 2021-07-08 RX ORDER — OXYCODONE HYDROCHLORIDE AND ACETAMINOPHEN 5; 325 MG/1; MG/1
1 TABLET ORAL EVERY 6 HOURS PRN
Qty: 8 TABLET | Refills: 0 | Status: SHIPPED | OUTPATIENT
Start: 2021-07-08 | End: 2021-07-16

## 2021-07-08 RX ADMIN — IBUPROFEN 600 MG: 600 TABLET, FILM COATED ORAL at 00:19

## 2021-07-08 RX ADMIN — IBUPROFEN 600 MG: 600 TABLET, FILM COATED ORAL at 09:48

## 2021-07-08 RX ADMIN — OXYCODONE HYDROCHLORIDE AND ACETAMINOPHEN 1 TABLET: 5; 325 TABLET ORAL at 00:15

## 2021-07-08 RX ADMIN — LACTULOSE 20 G: 20 SOLUTION ORAL at 09:43

## 2021-07-08 RX ADMIN — OXYCODONE HYDROCHLORIDE AND ACETAMINOPHEN 1 TABLET: 5; 325 TABLET ORAL at 09:48

## 2021-07-08 NOTE — PROGRESS NOTES
"      Edgar Ndiaye MD  [facility]  [address]  [place] [address]  Office [phone]  Fax [phone]    [facility]  Vaginal Delivery Progress Note    Subjective   Postpartum Day 3: Vaginal Delivery    The patient feels well.  Her pain is well controlled with nonsteroidal anti-inflammatory drugs and prescribed pain medications.   She is ambulating well.  Patient describes her bleeding as thin lochia.    Breastfeeding: infant latching without difficulty.    Objective     Vital Signs Range for the last 24 hours  Temperature: Temp:  [97.6 °F (36.4 °C)-98.5 °F (36.9 °C)] 98.5 °F (36.9 °C)   Temp Source: Temp src: Temporal   BP: BP: (93-96)/(50-62) 96/62   Pulse: Heart Rate:  [83-89] 83   Respirations: Resp:  [16-18] 16   SPO2: SpO2:  [99 %] 99 %   O2 Amount (l/min):     O2 Devices Device (Oxygen Therapy): room air   Weight:       Admit Height:  Height: 157.5 cm (62\")      Physical Exam:  General:  no acute distresss.  Abdomen: Fundus: appropriate, firm, non tender  Extremities: normal, atraumatic, no cyanosis, and trace edema.   Perineum repair intact without erythema or swelling    Lab results reviewed:  Yes   Rubella:  No results found for: RUBELLAIGGIN Nurse Transcribed from prenatal record --  No components found for: EXTRUBELQUAL  Rh Status:    RH type   Date Value Ref Range Status   07/05/2021 Positive  Final     Immunizations:   Immunization History   Administered Date(s) Administered   • Tdap 04/23/2021     Lab Results (last 24 hours)     ** No results found for the last 24 hours. **          External Prenatal Results     Pregnancy Outside Results - Transcribed From Office Records - See Scanned Records For Details     Test Value Date Time    ABO  AB  07/05/21 1806    Rh  Positive  07/05/21 1806    Antibody Screen  Negative  07/05/21 1806    Varicella IgG       Rubella ^ Immune  11/17/20     Hgb  10.3 g/dL 07/06/21 0338       11.7 g/dL 07/05/21 1806       10.7 g/dL 06/03/21 1127       " 9.7 g/dL 04/06/21 0821    Hct  30.6 % 07/06/21 0338       33.9 % 07/05/21 1806    Glucose Fasting GTT       Glucose Tolerance Test 1 hour       Glucose Tolerance Test 3 hour       Gonorrhea (discrete)  Negative  06/03/21 1247    Chlamydia (discrete)  Negative  06/03/21 1247    RPR ^ Non-Reactive  11/17/20     VDRL       Syphilis Antibody       HBsAg ^ Negative  11/17/20     Herpes Simplex Virus PCR ^ Negative 1&2  11/17/20     Herpes Simplex VIrus Culture       HIV ^ Non-Reactive  11/17/20     Hep C RNA Quant PCR       Hep C Antibody       AFP       Group B Strep  Negative  06/03/21 1247    GBS Susceptibility to Clindamycin       GBS Susceptibility to Erythromycin       Fetal Fibronectin       Genetic Testing, Maternal Blood             Drug Screening     Test Value Date Time    Urine Drug Screen       Amphetamine Screen       Barbiturate Screen       Benzodiazepine Screen       Methadone Screen       Phencyclidine Screen       Opiates Screen       THC Screen       Cocaine Screen       Propoxyphene Screen       Buprenorphine Screen       Methamphetamine Screen       Oxycodone Screen       Tricyclic Antidepressants Screen             Legend    ^: Historical                        Assessment/Plan       * No active hospital problems. *      Yajaira JOSHUA Owensel is Day 3  post-partum  Vaginal, Spontaneous  midline 4th degree laceration - repaired .      Plan:  Discharge home with standard precautions and return to clinic in 1 week.      Edgar Nidaye MD  7/8/2021  07:31 CDT

## 2021-07-08 NOTE — PAYOR COMM NOTE
"Ref:  [id number]    [facility]  [place],  [phone]  OR   FAX   [phone]    David Bergeron (30 y.o. Female)     Date of Birth Social Security Number Address Home Phone MRN    1990  [address] [phone] [id number]    [place] Marital Status          Other        Admission Date Admission Type Admitting Provider Attending Provider Department, Room/Bed    21 Elective Elodia Hyatt MD Sub[place], Matthew L, MD [facility] MOTHER BABY 2A, M239/1    Discharge Date Discharge Disposition Discharge Destination         Home or Self Care              Attending Provider: Edgar Ndiaye MD    Allergies: No Known Allergies    Isolation: None   Infection: None   Code Status: CPR    Ht: 157.5 cm (62\")   Wt: 73.9 kg (163 lb)    Admission Cmt: None   Principal Problem: None                Active Insurance as of 2021     Primary Coverage     Payor Plan Insurance Group Employer/Plan Group    ANTHEM BLUE CROSS ANTHEM BLUE CROSS BLUE SHIELD PPO [id number]     Payor Plan Address Payor Plan Phone Number Payor Plan Fax Number Effective Dates    [address] [phone]  2019 - None Entered    [place] [id number]       Subscriber Name Subscriber Birth Date Member ID       DAVID BERGERON 1990 [id number]                 Emergency Contacts      (Rel.) Home Phone Work Phone Mobile Phone    Otto Bergeron (Spouse) [phone] -- --      2021 AT  9:35 PM   VAGINAL DELIVERY  FEMALE  APGAR 8/9  WEIGHT 3090 GRAMS.  AFTER DELIVERY THE PATIENT WAS TAKEN TO SURGERY DUE TO VAGINAL LABIAL LACERATION REPAIR.  3RD DEGREE MIDLINE LACERATION REPAIRED.     OP NOTE:   over extensive 3rd degree laceration.  I could tell the patient was pushing that she had extremely thin perineum with a dense band of scar tissue -both the result of a previous obstetrical laceration repair.  As the head delivered, I tried to support the perineum, but her " entire previous laceration repair reopened.  In anticipation of this, I had injected the perineum with 2% lidocaine prior to delivery, but the patient was still exquisitely tender since she did not have an epidural.  Just the pressure of the loose hanging umbilical cord caused the patient to grimace, and the decision was made to take her to the operating room for repair.     Patient was moved to the operating room where general anesthesia was administered.  She was then placed in candycane stirrups and a Wiseman catheter placed.  The patient was carefully examined and found to have intact rectal mucosa, although her sphincter muscle had torn all the way through.  In addition to being full-thickness through the muscle, the patient's laceration extended far up into the vagina, becoming a Y which tore following both the right and left vaginal sulcus.  3-0 Vicryl suture was used for all layers of the repair.  Attention was first turned to the sulcus tears which were each reapproximated with 4 sutures; this was done first just to help hold tissue in a symmetrical orientation because it was so disfigured.  Attention was then turned to reapproximation of the patient's internal and external sphincter.  Muscles reapproximated with multiple layers of interrupted sutures; great care was taken to make sure that the repair remained symmetrical through all layers.  Finally, more superficial tissue was reapproximated in the typical fashion.  At the conclusion of the case, the patient's vagina was packed with vaginal packing material that was soaked in estrogen cream.  The nurse had instructions to leave this in place for 2 hours and then pull the packing material out.  The patient's Wiseman catheter will stay in place until Thursday.      Annamaria Veliz RN   Registered Nurse   Obstetrics   Plan of Care   Signed   Date of Service:  07/07/21 0405   Creation Time:  07/07/21 0405            Signed             Goal Outcome  "Evaluation:  Progress: improving  Outcome Summary: VSS, FFMLU1, scant lochia, perineum tender, swelling present, dermoplast used with some relief, Wiseman catheter still in place, cath care provided, output good, ambulating in room, Pain controlled with prn pain medications, SO at bedside, Bonding well with infant, breastfeeding occasionally mostly suppplementing, left nipple sore with blister present                  Micha Harden CRNA   Nurse Anesthetist   Specialty:  Certified Registered Nurse Anesthetist   Anesthesia Postprocedure Evaluation   Signed   Date of Service:  07/07/21 0740   Creation Time:  07/07/21 0740            Signed             Patient: Yajaira Doss          Procedure Summary      Date: 07/05/21 Room / Location:  [facility] LABOR DELIVERY 2 /  [facility] DELIVERY     Anesthesia Start: 2150 Anesthesia Stop: 2324     Procedure: VAGINAL/LABIAL LACERATION REPAIR (Bilateral Vagina) Diagnosis:      Surgeons: Elodia Hyatt MD Provider: Nick Mason CRNA     Anesthesia Type: general ASA Status: 2 - Emergent            Anesthesia Type: general     Vitals  Vitals Value Taken Time   BP 92/55 07/06/21 2150   Temp 97.6 °F (36.4 °C) 07/06/21 2010   Pulse 92 07/06/21 2010   Resp 16 07/06/21 2010   SpO2 98 % 07/06/21 2010               Post Anesthesia Care and Evaluation     Patient location during evaluation: PACU  Patient participation: complete - patient participated  Level of consciousness: awake and alert  Pain management: adequate  Airway patency: patent  Anesthetic complications: No anesthetic complications    Cardiovascular status: acceptable  Respiratory status: acceptable  Hydration status: acceptable  Post Neuraxial Block status: Motor and sensory function returned to baseline and No signs or symptoms of PDPH  Comments: Blood pressure 92/55, pulse 92, temperature 97.6 °F (36.4 °C), temperature source Oral, resp. rate 16, height 157.5 cm (62\"), weight 73.9 kg (163 lb), SpO2 98 %, " currently breastfeeding.    Pt discharged from PACU based on norma score >8                  2021 THE INFANT HAD A 5% WEIGHT LOSS    Aga Kirkpatrick, RN   Registered Nurse   Specialty:  Lactation Services   Lactation Note   Signed   Date of Service:  21 1230   Creation Time:  21 1333         (suggestion)   This note was copied to the following chart(s): Reggie Doss      Signed             Mother's Name: Yajaira Doss                      Phone #: [phone]  Infant Name:   Farrukh                : 21  Gestation: 40w4d  Day of life: 2  Birth weight:  6-13 (3090g)               Discharge weight:  Weight Loss: -5.44%  24 hour Summary of Feeds:   4 BF + 5 Formula feeds          Voids:  6          Stools:  6  Assistive devices (shields, shells, etc):  Significant Maternal history: , Anemia, BF 1st child with difficulty  Maternal Concerns:    Nipple pain and supply  Maternal Goal: breast and formula feed  Mother's Medications:  Colace, FE, PNV  Breastpump for home: Yes new Medela  Ped follow up appt:      Patient pumping. She reports L nipple pain improving but decided to do some pumping and give it a break. Encouraged pumping to protect her supply until she is able to return infant to breast for latching. Discussed flange fit and expected output from pumping the first few days. Recommended hand expressing after pumping. Flanges appear to be proper fit at present. Discharge packet provided. Reviewed nipple healing, signs baby is getting enough, and offered assistance as needed.                      History & Physical      Elodia Hyatt MD at 21          [facility]  Yajaira Doss  : 1990  MRN: [id number]  CSN: [id number]    History and Physical    Subjective   Yajaira Doss is a 30 y.o. year old  with an Estimated Date of Delivery: 21 currently at 40w4d presenting with brown discharge and increased frequency of urination.  UA negative for  "infection, but patient's contractions have progressively become more painful as the afternoon/evening has progressed.  She initially rated contractions 3/10, but now says they are 8/10 and she is breathing through them.  Patient reports h/o uncomplicated  X 1.    Prenatal care has been with Dr. Sage Ndiaye.  It has been benign.    OB History    Para Term  AB Living   2 1 1 0 0 1   SAB TAB Ectopic Molar Multiple Live Births   0 0 0 0 0 1      # Outcome Date GA Lbr [place]/2nd Weight Sex Delivery Anes PTL Lv   2 Current            1 Term 18 40w3d 08:28 / 01:55 2940 g (6 lb 7.7 oz) F Vag-Spont EPI N TERRA      Birth Comments: hc 33 cm       Name: KINGA BERGERON      Apgar1: 8  Apgar5: 9     Past Medical History:   Diagnosis Date   • Anemia      No past surgical history on file.    Current Facility-Administered Medications:   •  lactated ringers infusion, 125 mL/hr, Intravenous, Continuous, Elodia Hyatt MD, Last Rate: 125 mL/hr at 21 1854, 125 mL/hr at 21 185  •  sodium chloride 0.9 % flush 10 mL, 10 mL, Intravenous, Q12H, Elodia Haytt MD  •  sodium chloride 0.9 % flush 10 mL, 10 mL, Intravenous, PRN, Elodia Hyatt MD    No Known Allergies  Social History    Tobacco Use      Smoking status: Never Smoker      Smokeless tobacco: Never Used    Review of Systems      Objective   /67   Pulse 78   Temp 97 °F (36.1 °C) (Temporal)   Resp 18   Ht 157.5 cm (62\")   Wt 73.9 kg (163 lb)   BMI 29.81 kg/m²   General: well developed; well nourished  no acute distress   Heart: Not performed.   Lungs: breathing is unlabored   Abdomen: soft, non-tender; no masses  fundus firm and non-tender   FHT's: reactive, reassuring  145 +accels, one late decel several minutes ago, but no other decels, moderate variability   Cervix: was checked (by me): 4-5 cm / 60 % / -3   Presentation: cephalic   Contractions:  EFW by Leopold's:  EFW by recent u/s: regular every 3 minutes           Prenatal " Labs  Lab Results   Component Value Date    HGB 11.7 (L) 07/05/2021    HEPBSAG Negative 11/28/2017    ABO AB 07/05/2021    RH Positive 07/05/2021    ABSCRN Negative 07/05/2021    [id number] Non-Reactive 11/28/2017    URINECX Final report 05/27/2021       Current Labs Reviewed   UA and all new OB labs       Assessment   1. IUP at 40w4d in early labor  2. Fetus reassuring  3. Group B strep status: negative     Plan   1. Expectant management   2. Will move to a labor room, and then AROM  3. Patient not planning epidural.  Have advised her that SQ morphine is also an option.    Elodia Hyatt MD  7/5/2021  19:27 CDT       Electronically signed by Elodia Hyatt MD at 07/05/21 1932       Orders (last 7 days)      Start     Ordered    07/08/21 0733  Discharge patient  Once      07/08/21 0735    07/08/21 0000  oxyCODONE-acetaminophen (PERCOCET) 5-325 MG per tablet  Every 6 Hours PRN      07/08/21 0735    07/07/21 0749  Discontinue Indwelling Urinary Catheter  Once      07/07/21 0749    07/07/21 0749  Notify Provider if Bladder Distention Continues  Until Discontinued      07/07/21 0749    07/07/21 0749  Consult Pharmacist For Review of Medications That May Cause Urinary Retention - RN To Place Order for Consult it Needed  Continuous      07/07/21 0749    07/06/21 0900  lactulose solution 20 g  Daily      07/05/21 2331    07/06/21 0800  Sitz Bath  3 Times Daily     Comments: PRN    07/06/21 0230    07/06/21 0600  CBC & Differential  Timed     Comments: Postpartum Day 1      07/06/21 0230    07/06/21 0600  Hemoglobin & Hematocrit, Blood  Timed,   Status:  Canceled     Comments: Postpartum Day 1      07/06/21 0230    07/06/21 0600  CBC Auto Differential  PROCEDURE ONCE     Comments: Postpartum Day 1      07/06/21 0230    07/06/21 0520  DIET MESSAGE Patient does not eat meat or eggs. Will eat eggs that are cooked into dishes like in cake.  Once     Comments: Patient does not eat meat or eggs. Will eat eggs that are cooked into  dishes like in cake.    07/06/21 0526    07/06/21 0330  carboprost (HEMABATE) injection 250 mcg  Once      07/06/21 0230    07/06/21 0330  miSOPROStol (CYTOTEC) tablet 600 mcg  Once      07/06/21 0230    07/06/21 0330  methylergonovine (METHERGINE) injection 200 mcg  Once      07/06/21 0230    07/06/21 0250  Transfer Patient  Once      07/06/21 0249    07/06/21 0249  calcium carbonate (TUMS) chewable tablet 500 mg (200 mg elemental)  3 Times Daily PRN      07/06/21 0249    07/06/21 0230  Diet Regular  Diet Effective Now      07/06/21 0230    07/06/21 0230  Code Status and Medical Interventions:  Continuous      07/06/21 0230    07/06/21 0230  Vital Signs Per hospital policy  Per Hospital Policy      07/06/21 0230    07/06/21 0230  Notify Physician  Until Discontinued      07/06/21 0230    07/06/21 0230  Up Ad Shahla  Until Discontinued      07/06/21 0230    07/06/21 0230  Advance Diet as Tolerated  Until Discontinued      07/06/21 0230    07/06/21 0230  Fundal and Lochia Check  Per Hospital Policy     Comments: Q 15 min x 4, Q 30 min x 2, then Q Shift    07/06/21 0230    07/06/21 0230  RN to Assess Rh Status & Place RhIG Evaluation Order if Indicated  Continuous      07/06/21 0230    07/06/21 0230  Bladder Assessment  Per Order Details     Comments: Postpartum 1) Upon Admission to Unit & Every 4 Hours PRN Until Voiding. 2) Out of Bed to Void in 8 Hours.    07/06/21 0230    07/06/21 0230  Straight Cath  Per Order Details     Comments: Postpartum: If Distended & Unable to Void, May Repeat Once.    07/06/21 0230    07/06/21 0230  Indwelling Urinary Catheter  Per Order Details,   Status:  Canceled     Comments: Postpartum : After Straight Cathed x2 or if Greater Than 1000mL Residual, Insert Indwelling Urinary Catheter Until Further MD Order.    07/06/21 0230    07/06/21 0230  Remove Vaginal Packing  Once      07/06/21 0230    07/06/21 0230  Breast pump to bed  Once      07/06/21 0230    07/06/21 0230  If indicated --  Please administer RH Immunoglobulin based on results of cord blood evaluation and fetal screen lab tests, pharmacy to dispense  Per Order Details     Comments: See Process Instructions For Reference Range Details.    07/06/21 0230    07/06/21 0229  Ambulate Patient  Every Shift      07/06/21 0230    07/06/21 0229  sodium chloride 0.9 % flush 1-10 mL  As Needed      07/06/21 0230    07/06/21 0229  ibuprofen (ADVIL,MOTRIN) tablet 600 mg  Every 8 Hours PRN      07/06/21 0230    07/06/21 0229  Morphine sulfate (PF) injection 1 mg  Every 4 Hours PRN      07/06/21 0230    07/06/21 0229  naloxone (NARCAN) injection 0.4 mg  Every 5 Minutes PRN      07/06/21 0230    07/06/21 0229  magnesium hydroxide (MILK OF MAGNESIA) suspension 2400 mg/10mL 10 mL  Daily PRN      07/06/21 0230    07/06/21 0229  benzocaine-menthol (DERMOPLAST) 20-0.5 % topical spray  As Needed      07/06/21 0230    07/06/21 0229  Hydrocort-Pramoxine (Perianal) (PROCTOFOAM-HS) 1-1 % rectal foam 1 applicator  As Needed      07/06/21 0230    07/06/21 0229  Hydrocortisone (Perianal) (ANUSOL-HC) 2.5 % rectal cream 1 application  As Needed      07/06/21 0230    07/06/21 0229  ondansetron (ZOFRAN) tablet 4 mg  Every 8 Hours PRN      07/06/21 0230    07/06/21 0229  ondansetron (ZOFRAN) injection 4 mg  Every 6 Hours PRN      07/06/21 0230    07/06/21 0229  promethazine (PHENERGAN) tablet 25 mg  Every 6 Hours PRN      07/06/21 0230    07/06/21 0229  promethazine (PHENERGAN) suppository 12.5 mg  Every 6 Hours PRN      07/06/21 0230    07/06/21 0229  Measles, Mumps & Rubella Vac (MMR) injection 0.5 mL  During Hospitalization      07/06/21 0230    07/06/21 0229  Tdap (BOOSTRIX) injection 0.5 mL  During Hospitalization      07/06/21 0230    07/06/21 0229  oxyCODONE-acetaminophen (PERCOCET) 5-325 MG per tablet 1 tablet  Every 4 Hours PRN      07/06/21 0230    07/06/21 0229  oxyCODONE-acetaminophen (PERCOCET)  MG per tablet 1 tablet  Every 4 Hours PRN      07/06/21  0230    07/06/21 0229  bisacodyl (DULCOLAX) suppository 10 mg  Daily PRN      07/06/21 0230    07/06/21 0104  oxyCODONE-acetaminophen (PERCOCET) 5-325 MG per tablet 2 tablet  Every 4 Hours PRN,   Status:  Discontinued      07/06/21 0104    07/06/21 0030  conjugated estrogens (PREMARIN) vaginal cream 2 g  Once      07/05/21 2344    07/05/21 2331  Continue Indwelling Urinary Catheter  Once      07/05/21 2331 07/05/21 2331  Assess Need for Indwelling Urinary Catheter - Follow Removal Protocol  Continuous,   Status:  Canceled     Comments: Indwelling Urinary Catheter Removal Criteria  Discontinue Indwelling Urinary Catheter Unless One of the Following is Present:  Urinary Retention or Obstruction  Chronic Urinary Catheter Use  End of Life  Critical Illness with Strict I/O   Tract or Abdominal Surgery  Stage 3/4 Sacral / Perineal Wound  Required Activity Restriction: Trauma  Required Activity Restriction: Spine Surgery  If Patient is Being Followed by Urology Contact Them PRIOR to Removal  Do Not Remove Indwelling Urinary Catheter Order is Present with a CLINICAL REASON to Maintain the Catheter. Provider is Required to Include a Clinical Reason to Maintain a Urinary Catheter    Patient Admitted With Indwelling Urinary Catheter (Not Placed at [facility])  Assess for Continued Need & Document Medical Necessity  If Infection is Suspected, Contact the Provider        07/05/21 2331 07/05/21 2331  Urinary Catheter Care  Every Shift,   Status:  Canceled      07/05/21 2331    07/05/21 2330  Vaginal Packing  Once     Comments: Pull vaginal packing after two hours    07/05/21 2331 07/05/21 2329  VTE Prophylaxis Not Indicated: Recent Trauma and/or Surgery (2); </= 3 (Low Risk)  Once      07/05/21 2329 07/05/21 2300  oxytocin (PITOCIN) 30 units in 0.9% sodium chloride 500 mL (premix)  Continuous PRN      07/05/21 2024 07/05/21 2300  conjugated estrogens (PREMARIN) vaginal cream 2 g  Daily,   Status:   Discontinued      21  Tissue Pathology Exam  Once     Comments: Gestational Age: 40w4d [id number]:1 Minute: 85 Minute: 9Maternal Complications: VAGINAL REPAIR NEEDED IN THE OR - THIRD DGREEDelivery Complications: NONEMaternal Medical History:Past Medical History:No date: Anemia      21  lidocaine-EPINEPHrine (XYLOCAINE W/EPI) 2 %-1:208695 injection 20 mL  Once,   Status:  Discontinued      21  lidocaine (XYLOCAINE) 2% injection 10 mL  Once,   Status:  Discontinued      21 220  lidocaine (XYLOCAINE) 2% injection 10 mL  Once      21  oxytocin (PITOCIN) 30 units in 0.9% sodium chloride 500 mL (premix)  Continuous      21  lidocaine (XYLOCAINE) 2% injection 60 mL  As Needed,   Status:  Discontinued      21 2100  sodium chloride 0.9 % flush 10 mL  Every 12 Hours Scheduled,   Status:  Discontinued      21  lactated ringers infusion  Continuous,   Status:  Discontinued      21  lactated ringers bolus 1,000 mL  Once,   Status:  Discontinued      21  oxytocin (PITOCIN) 30 units in 0.9% sodium chloride 500 mL (premix)  Once      21  Tissue Pathology Exam  Once,   Status:  Canceled      21  methylergonovine (METHERGINE) injection 200 mcg  Once As Needed,   Status:  Discontinued      21  carboprost (HEMABATE) injection 250 mcg  As Needed,   Status:  Discontinued      21  miSOPROStol (CYTOTEC) tablet 800 mcg  As Needed,   Status:  Discontinued      21  Intake and Output  Every Shift,   Status:  Canceled     Comments: Every shift if on IV Tocolytics.    21  VTE Prophylaxis Not Indicated: No Risk  Factors (0); </= 3 (Low Risk)  Once      07/05/21 2006 07/05/21 2000  Admit To Obstetrics Inpatient  Once      07/05/21 2005 07/05/21 2000  Obtain informed consent  Once,   Status:  Canceled      07/05/21 2005 07/05/21 2000  Vital Signs Per hospital policy  Per Hospital Policy,   Status:  Canceled      07/05/21 2005 07/05/21 2000  Confirm presence of amniotic fluid  Once,   Status:  Canceled     Comments: If patient present with SROM    07/05/21 2005 07/05/21 2000  Keep exams to a minimum on patients with SROM  Continuous,   Status:  Canceled      07/05/21 2005 07/05/21 2000  Mini- prep prior to delivery  Once,   Status:  Canceled      07/05/21 2005 07/05/21 2000  Continuous Fetal Monitoring With NST on Admission and Prior to Initiation of Oxytocin.  Per Order Details,   Status:  Canceled     Comments: Continuous Fetal Monitoring With NST on Admission & Prior to Initiation of Oxytocin.    07/05/21 2005 07/05/21 2000  External Uterine Contraction Monitoring  Per Hospital Policy,   Status:  Canceled      07/05/21 2005 07/05/21 2000  Notify Physician (specified)  Until Discontinued,   Status:  Canceled      07/05/21 2005 07/05/21 2000  Notify physician for tachysystole (per hospital algorithm)  Until Discontinued,   Status:  Canceled      07/05/21 2005 07/05/21 2000  Notify physician if membranes ruptured, bleeding greater than 1 pad an hour, fetal heart tone abnormality, and severe pain  Until Discontinued,   Status:  Canceled      07/05/21 2005 07/05/21 2000  Up Ad Shahla  Until Discontinued,   Status:  Canceled     Comments: Patient may stand/walk/use birthing ball or be in rocking chair as long as fetus still being monitored continuously    07/05/21 2005 07/05/21 2000  Cervical Exam  Once,   Status:  Canceled     Comments: Unless contraindicated, every 1-2 hours in active labor, or at nurse's discretion.    07/05/21 2005 07/05/21 2000  Initiate Group Beta Strep (GBS)  Prophylaxis Protocol, If Criteria Met  Continuous,   Status:  Canceled     Comments: NO TREATMENT RECOMMENDED IF: 1)  Maternal GBS status known negative 2)  Scheduled  birth with intact membranes, not in labor.  3 ) Maternal GBS unknown, no risk factors.   TREAT WITH ANTIBIOTICS IF:  1)  Maternal GBS status is known postive.  2)  Maternal GBS status unknown with these risk factors:  a)  Previous infant affected by GBS infection.  b)  GBS urinary tract infection (UTI) or bacteruria during pregnancy  c)  Unexplained maternal fever in labor (greater than or equal to 100.4F or 38.0C)  d)  Prolonged rupture of the membranes greater than or equal to 18 hours.  e)  Gestational age less than 37 weeks.    21  NPO Diet NPO Except: Ice chips  Diet Effective Now,   Status:  Canceled      21  Inpatient consult to Anesthesiology  Once,   Status:  Canceled     Comments: Patient may have epidural prn   Specialty:  Anesthesiology  Provider:  (Not yet assigned)    21  CBC (No Diff)  Once,   Status:  Canceled      21  promethazine (PHENERGAN) suppository 12.5 mg  Every 6 Hours PRN,   Status:  Discontinued      21  promethazine (PHENERGAN) tablet 12.5 mg  Every 6 Hours PRN,   Status:  Discontinued      21  terbutaline (BRETHINE) injection 0.25 mg  As Needed,   Status:  Discontinued      21  Position change  As Needed,   Status:  Canceled     Comments: For intra-uterine resuscitation for hypertonus, hypertstimulation, or non-reassuring fetal status    21  acetaminophen (TYLENOL) tablet 650 mg  Every 4 Hours PRN,   Status:  Discontinued      21  Morphine injection 10 mg  Every 2 Hours PRN,   Status:  Discontinued      21  ondansetron (ZOFRAN) tablet 4 mg   Every 6 Hours PRN,   Status:  Discontinued      07/05/21 2005 07/05/21 1959  ondansetron (ZOFRAN) injection 4 mg  Every 6 Hours PRN,   Status:  Discontinued      07/05/21 2005 07/05/21 1845  lactated ringers infusion  Continuous,   Status:  Discontinued      07/05/21 1745 07/05/21 1845  lactated ringers bolus 1,000 mL  Once      07/05/21 1745 07/05/21 1745  Insert Peripheral IV  Once      07/05/21 1745 07/05/21 1745  Saline Lock & Maintain IV Access  Continuous,   Status:  Canceled      07/05/21 1745 07/05/21 1745  CBC & Differential  Once      07/05/21 1745    07/05/21 1745  Type & Screen  Once      07/05/21 1745 07/05/21 1745  CBC Auto Differential  PROCEDURE ONCE      07/05/21 1745 07/05/21 1744  sodium chloride 0.9 % flush 10 mL  As Needed,   Status:  Discontinued      07/05/21 1745 07/05/21 1631  Urinalysis, Microscopic Only - Urine, Clean Catch  Once      07/05/21 1630    07/05/21 1610  Urinalysis With Microscopic If Indicated (No Culture) - Urine, Clean Catch  Once      07/05/21 1609    07/05/21 0000  HSV 1/2, PCR - ,     Comments: This is an external result entered through the Results Console.    07/05/21 1949 07/05/21 0000  Hepatitis B Surface Antigen     Comments: This is an external result entered through the Results Console.      07/05/21 1949 07/05/21 0000  RPR     Comments: This is an external result entered through the Results Console.      07/05/21 1949 07/05/21 0000  Rubella Antibody, IgG     Comments: This is an external result entered through the Results Console.      07/05/21 1949 07/05/21 0000  HIV-1 Antibody, EIA     Comments: This is an external result entered through the Results Console.      07/05/21 1949    Unscheduled  Apply Ice to Perineum  As Needed     Comments: For 20 min q 2 hrs    07/06/21 0230    Unscheduled  Waffle Cushion  As Needed     Comments: For perineal discomfort    07/06/21 0230    Unscheduled  Donut Ring  As Needed     Comments: For  perineal pain    07/06/21 0230    Unscheduled  Kpad  As Needed     Comments: For pain    07/06/21 0230    Unscheduled  Apply witch hazel pads / TUCKS to perineum as needed for comfort PRN  As Needed      07/06/21 0230    Unscheduled  Warm compress  As Needed      07/06/21 0230    Unscheduled  Apply ace wrap, tight bra, or binder  As Needed     Comments: Patient to be wearing a bra at all times, but no binders or ace wraps    07/06/21 0230    Unscheduled  Apply ice packs  As Needed      07/06/21 0230    Unscheduled  Bladder Scan if Patient Unable to Void 4-6 Hours After Catheter Removal  As Needed      07/07/21 0749    Unscheduled  If Bladder Scan Volume is Less Than 500mL & Patient is Without Symptoms of Bladder Discomfort / Distention Monitor Every 1-2 Hours for Spontaneous Void  As Needed      07/07/21 0749    Unscheduled  Straight Cath Every 4-6 Hours As Needed If Patient is Unable to Void After 4-6 Hours, Bladder Scan Volume is Greater Than 500mL & Patient Has Symptoms of Bladder Discomfort / Distention  As Needed      07/07/21 0749    Unscheduled  Schedule / Prompt Voiding For Patients With Urinary Incontinence  As Needed      07/07/21 0749    Signed and Held  Notify Physician (specified)  Until Discontinued      Signed and Held    Signed and Held  Vital Signs Per hospital policy  Per Hospital Policy      Signed and Held    Signed and Held  Up Ad Shahla  Until Discontinued      Signed and Held    Signed and Held  Strict Intake and Output  Every Shift      Signed and Held    Signed and Held  Fundal & Lochia Check  Per Order Details     Comments: Every 15 Minutes x4, Then Every 30 Minutes x2, Then Every Shift    Signed and Held    Signed and Held  Fundal & Lochia Check  Every Shift      Signed and Held    Signed and Held  Apply Ice to Perineum  As Needed      Signed and Held    Signed and Held  acetaminophen (TYLENOL) tablet 650 mg  Every 4 Hours PRN      Signed and Held    Signed and Held  HYDROmorphone (DILAUDID)  injection 1 mg  Every 2 Hours PRN      Signed and Held    Signed and Held  ondansetron (ZOFRAN) tablet 4 mg  Every 6 Hours PRN      Signed and Held    Signed and Held  ondansetron (ZOFRAN) injection 4 mg  Every 6 Hours PRN      Signed and Held    Signed and Held  promethazine (PHENERGAN) suppository 12.5 mg  Every 6 Hours PRN      Signed and Held    Signed and Held  promethazine (PHENERGAN) tablet 12.5 mg  Every 6 Hours PRN      Signed and Held    Signed and Held  famotidine (PEPCID) injection 20 mg  Once As Needed      Signed and Held    Signed and Held  famotidine (PEPCID) tablet 20 mg  Once As Needed      Signed and Held    Signed and Held  calcium carbonate (TUMS) chewable tablet 500 mg (200 mg elemental)  3 Times Daily PRN      Signed and Held                   Operative/Procedure Notes (last 7 days) (Notes from 07/01/21 1042 through 07/08/21 1042)      Elodia Hyatt MD at 07/05/21 2318          [facility]  Vaginal Delivery Note    Delivery     Delivery: Vaginal, Spontaneous     YOB: 2021    Time of Birth:  Gestational Age 9:25 PM   40w4d     Anesthesia: Local     Delivering clinician: Elodia Hyatt    Forceps?   No   Vacuum? No    Shoulder dystocia present: No        Delivery narrative:  Patient progressed quickly to complete, without an epidural.  She pushed for only 20 minutes to deliver viable female infant, OA, over extensive 3rd degree laceration.  I could tell the patient was pushing that she had extremely thin perineum with a dense band of scar tissue -both the result of a previous obstetrical laceration repair.  As the head delivered, I tried to support the perineum, but her entire previous laceration repair reopened.  In anticipation of this, I had injected the perineum with 2% lidocaine prior to delivery, but the patient was still exquisitely tender since she did not have an epidural.  Just the pressure of the loose hanging umbilical cord caused the patient to grimace, and the  decision was made to take her to the operating room for repair.    Patient was moved to the operating room where general anesthesia was administered.  She was then placed in candycane stirrups and a Wiseman catheter placed.  The patient was carefully examined and found to have intact rectal mucosa, although her sphincter muscle had torn all the way through.  In addition to being full-thickness through the muscle, the patient's laceration extended far up into the vagina, becoming a Y which tore following both the right and left vaginal sulcus.  3-0 Vicryl suture was used for all layers of the repair.  Attention was first turned to the sulcus tears which were each reapproximated with 4 sutures; this was done first just to help hold tissue in a symmetrical orientation because it was so disfigured.  Attention was then turned to reapproximation of the patient's internal and external sphincter.  Muscles reapproximated with multiple layers of interrupted sutures; great care was taken to make sure that the repair remained symmetrical through all layers.  Finally, more superficial tissue was reapproximated in the typical fashion.  At the conclusion of the case, the patient's vagina was packed with vaginal packing material that was soaked in estrogen cream.  The nurse had instructions to leave this in place for 2 hours and then pull the packing material out.  The patient's Wiseman catheter will stay in place until Thursday.    Infant    Findings: female  infant     Infant observations: Weight: 3090 g (6 lb 13 oz)   Length: 20  in  Observations/Comments:  33cm      Apgars: 8  @ 1 minute /    9  @ 5 minutes   Infant Name: undecided     Placenta, Cord, and Fluid    Placenta delivered  Spontaneous  at   7/5/2021 10:09 PM     Cord:   present.   Nuchal Cord?  no   Cord blood obtained: No    Cord gases obtained:  Yes    Cord gas results: Venous:  No results found for: PHCVEN    Arterial:  No results found for: PHCART     Repair   "  Episiotomy: None     No    Lacerations: Yes  Laceration Information  Laceration Repaired?   Perineal: 3rd  Yes    Periurethral:       Labial:       Sulcus:       Vaginal:       Cervical:         Suture used for repair: 3-0 Vicryl   Estimated Blood Loss:  200 ml           Complications  none    Disposition  Mother to Mother Baby/Postpartum  in stable condition currently.  Baby to remains with mom  in stable condition currently.      Elodia Hyatt MD  07/05/21  23:18 CDT        Electronically signed by Elodia Hyatt MD at 07/05/21 9758          Physician Progress Notes (last 7 days) (Notes from 07/01/21 1042 through 07/08/21 1042)      Edgar Ndiaye MD at 07/08/21 0715                Edgar Ndiaye MD  [facility]  [address] [place] [address]  Office [phone]  Fax [phone]    [facility]  Vaginal Delivery Progress Note    Subjective   Postpartum Day 3: Vaginal Delivery    The patient feels well.  Her pain is well controlled with nonsteroidal anti-inflammatory drugs and prescribed pain medications.   She is ambulating well.  Patient describes her bleeding as thin lochia.    Breastfeeding: infant latching without difficulty.    Objective     Vital Signs Range for the last 24 hours  Temperature: Temp:  [97.6 °F (36.4 °C)-98.5 °F (36.9 °C)] 98.5 °F (36.9 °C)   Temp Source: Temp src: Temporal   BP: BP: (93-96)/(50-62) 96/62   Pulse: Heart Rate:  [83-89] 83   Respirations: Resp:  [16-18] 16   SPO2: SpO2:  [99 %] 99 %   O2 Amount (l/min):     O2 Devices Device (Oxygen Therapy): room air   Weight:       Admit Height:  Height: 157.5 cm (62\")      Physical Exam:  General:  no acute distresss.  Abdomen: Fundus: appropriate, firm, non tender  Extremities: normal, atraumatic, no cyanosis, and trace edema.   Perineum repair intact without erythema or swelling    Lab results reviewed:  Yes   Rubella:  No results found for: RUBELLAIGGIN Nurse Transcribed from prenatal record --  No " components found for: EXTRUBELQUAL  Rh Status:    RH type   Date Value Ref Range Status   07/05/2021 Positive  Final     Immunizations:   Immunization History   Administered Date(s) Administered   • Tdap 04/23/2021     Lab Results (last 24 hours)     ** No results found for the last 24 hours. **          External Prenatal Results     Pregnancy Outside Results - Transcribed From Office Records - See Scanned Records For Details     Test Value Date Time    ABO  AB  07/05/21 1806    Rh  Positive  07/05/21 1806    Antibody Screen  Negative  07/05/21 1806    Varicella IgG       Rubella ^ Immune  11/17/20     Hgb  10.3 g/dL 07/06/21 0338       11.7 g/dL 07/05/21 1806       10.7 g/dL 06/03/21 1127       9.7 g/dL 04/06/21 0821    Hct  30.6 % 07/06/21 0338       33.9 % 07/05/21 1806    Glucose Fasting GTT       Glucose Tolerance Test 1 hour       Glucose Tolerance Test 3 hour       Gonorrhea (discrete)  Negative  06/03/21 1247    Chlamydia (discrete)  Negative  06/03/21 1247    RPR ^ Non-Reactive  11/17/20     VDRL       Syphilis Antibody       HBsAg ^ Negative  11/17/20     Herpes Simplex Virus PCR ^ Negative 1&2  11/17/20     Herpes Simplex VIrus Culture       HIV ^ Non-Reactive  11/17/20     Hep C RNA Quant PCR       Hep C Antibody       AFP       Group B Strep  Negative  06/03/21 1247    GBS Susceptibility to Clindamycin       GBS Susceptibility to Erythromycin       Fetal Fibronectin       Genetic Testing, Maternal Blood             Drug Screening     Test Value Date Time    Urine Drug Screen       Amphetamine Screen       Barbiturate Screen       Benzodiazepine Screen       Methadone Screen       Phencyclidine Screen       Opiates Screen       THC Screen       Cocaine Screen       Propoxyphene Screen       Buprenorphine Screen       Methamphetamine Screen       Oxycodone Screen       Tricyclic Antidepressants Screen             Legend    ^: Historical                        Assessment/Plan       * No active hospital  "problems. *      Yajaira Doss is Day 3  post-partum  Vaginal, Spontaneous  midline 4th degree laceration - repaired .      Plan:  Discharge home with standard precautions and return to clinic in 1 week.      Edgar Ndiaye MD  7/8/2021  07:31 CDT        Electronically signed by Edgar Ndiaye MD at 07/08/21 0732     Lea Zuluaga DO at 07/07/21 0736          [facility]  Vaginal Delivery Progress Note    Subjective   Postpartum Day 2: Vaginal Delivery    The patient feels well.  Her pain is well controlled with nonsteroidal anti-inflammatory drugs and opioid analgesics.   She is ambulating well.  Patient describes her bleeding as thin lochia.    Breastfeeding: infant latching without difficulty.    Objective     Vital Signs Range for the last 24 hours  Temperature: Temp:  [97.6 °F (36.4 °C)-98.7 °F (37.1 °C)] 97.6 °F (36.4 °C)   Temp Source: Temp src: Oral   BP: BP: (80-99)/(55-62) 92/55   Pulse: Heart Rate:  [92] 92   Respirations: Resp:  [16] 16   SPO2: SpO2:  [97 %-98 %] 98 %   O2 Amount (l/min):     O2 Devices Device (Oxygen Therapy): room air   Weight:       Admit Height:  Height: 157.5 cm (62\")      Physical Exam:  General:  no acute distresss.  Abdomen: abdomen is soft without significant tenderness, masses, organomegaly or guarding. Fundus: appropriate, firm, non tender  Extremities: normal, atraumatic, no cyanosis, and trace edema.     Lab results reviewed:  Yes   Rubella:  No results found for: RUBELLAIGGIN Nurse Transcribed from prenatal record --  No components found for: EXTRUBELQUAL  Rh Status:    RH type   Date Value Ref Range Status   07/05/2021 Positive  Final     Immunizations:   Immunization History   Administered Date(s) Administered   • Tdap 04/23/2021     Lab Results (last 24 hours)     ** No results found for the last 24 hours. **          Assessment/Plan       * No active hospital problems. *      Yajaira Doss is Day 2  post-partum  Vaginal, Spontaneous  midline 3rd " "degree laceration - repaired .      Plan:  Remove ni catheter.      Lea Zuluaga DO  7/7/2021  07:37 CDT        Electronically signed by Lea Zuluaga DO at 07/07/21 0738     Elodia Hyatt MD at 07/06/21 0723          [facility]  Vaginal Delivery Progress Note    Subjective   Postpartum Day 1: Vaginal Delivery    The patient feels tired.  Her pain is moderately controlled with ibuprofen (OTC) and Percocet.   She is not ambulating well, but has not been on this unit for very long.  Patient describes her bleeding as moderate lochia.      Objective     Vital Signs Range for the last 24 hours  Temperature: Temp:  [97 °F (36.1 °C)-98.3 °F (36.8 °C)] 97.7 °F (36.5 °C)   Temp Source: Temp src: Oral   BP: BP: ()/(58-87) 100/58   Pulse: Heart Rate:  [] 84   Respirations: Resp:  [16-20] 16   SPO2: SpO2:  [98 %-100 %] 98 %   O2 Amount (l/min):     O2 Devices Device (Oxygen Therapy): room air   Weight: Weight:  [73.9 kg (163 lb)] 73.9 kg (163 lb)     Admit Height:  Height: 157.5 cm (62\")      Physical Exam:  General:  no acute distresss.  Lungs:  breathing is unlabored  Abdomen: Fundus: appropriate, firm, non tender  Extremities: normal, atraumatic, no cyanosis, and Negative edema.     Lab results reviewed:  Yes   Rubella:  No results found for: RUBELLAIGGIN Nurse Transcribed from prenatal record --  No components found for: EXTRUBELQUAL  Rh Status:    RH type   Date Value Ref Range Status   07/05/2021 Positive  Final     Immunizations:   Immunization History   Administered Date(s) Administered   • Tdap 04/23/2021     Lab Results (last 24 hours)     Procedure Component Value Units Date/Time    CBC & Differential [[id number]]  (Abnormal) Collected: 07/06/21 0338    Specimen: Blood Updated: 07/06/21 5028    Narrative:      The following orders were created for panel order CBC & Differential.  Procedure                               Abnormality         Status                     ---------          "                      -----------         ------                     CBC Auto Differential[[id number]]        Abnormal            Final result                 Please view results for these tests on the individual orders.    CBC Auto Differential [[id number]]  (Abnormal) Collected: 07/06/21 0338    Specimen: Blood Updated: 07/06/21 0502     WBC 13.69 10*3/mm3      RBC 3.35 10*6/mm3      Hemoglobin 10.3 g/dL      Hematocrit 30.6 %      MCV 91.3 fL      MCH 30.7 pg      MCHC 33.7 g/dL      RDW 13.6 %      RDW-SD 46.1 fl      MPV 11.0 fL      Platelets 159 10*3/mm3      Neutrophil % 71.4 %      Lymphocyte % 22.4 %      Monocyte % 4.5 %      Eosinophil % 0.1 %      Basophil % 0.4 %      Immature Grans % 1.2 %      Neutrophils, Absolute 9.79 10*3/mm3      Lymphocytes, Absolute 3.06 10*3/mm3      Monocytes, Absolute 0.62 10*3/mm3      Eosinophils, Absolute 0.01 10*3/mm3      Basophils, Absolute 0.05 10*3/mm3      Immature Grans, Absolute 0.16 10*3/mm3      nRBC 0.0 /100 WBC     Hepatitis B Surface Antigen [[id number]] Resulted: 11/17/20    Specimen: Blood Updated: 07/05/21 1949     External Hepatitis B Surface Ag Negative    RPR [[id number]] Resulted: 11/17/20    Specimen: Blood Updated: 07/05/21 1949     External RPR Non-Reactive    Rubella Antibody, IgG [[id number]] Resulted: 11/17/20    Specimen: Blood Updated: 07/05/21 1949     External Rubella Qual Immune    HIV-1 Antibody, EIA [[id number]] Resulted: 11/17/20    Specimen: Blood Updated: 07/05/21 1949     External HIV Antibody Non-Reactive    HSV 1/2, PCR - , [[id number]] Resulted: 11/17/20     Updated: 07/05/21 1949     Herpes PCR Negative 1&2    CBC & Differential [[id number]]  (Abnormal) Collected: 07/05/21 1806    Specimen: Blood Updated: 07/05/21 1813    Narrative:      The following orders were created for panel order CBC & Differential.  Procedure                               Abnormality         Status                     ---------                                -----------         ------                     CBC Auto Differential[[id number]]        Abnormal            Final result                 Please view results for these tests on the individual orders.    CBC Auto Differential [[id number]]  (Abnormal) Collected: 07/05/21 1806    Specimen: Blood Updated: 07/05/21 1813     WBC 11.76 10*3/mm3      RBC 3.79 10*6/mm3      Hemoglobin 11.7 g/dL      Hematocrit 33.9 %      MCV 89.4 fL      MCH 30.9 pg      MCHC 34.5 g/dL      RDW 13.7 %      RDW-SD 44.5 fl      MPV 10.7 fL      Platelets 177 10*3/mm3      Neutrophil % 61.9 %      Lymphocyte % 28.3 %      Monocyte % 6.9 %      Eosinophil % 0.6 %      Basophil % 0.4 %      Immature Grans % 1.9 %      Neutrophils, Absolute 7.28 10*3/mm3      Lymphocytes, Absolute 3.33 10*3/mm3      Monocytes, Absolute 0.81 10*3/mm3      Eosinophils, Absolute 0.07 10*3/mm3      Basophils, Absolute 0.05 10*3/mm3      Immature Grans, Absolute 0.22 10*3/mm3      nRBC 0.0 /100 WBC     Urinalysis With Microscopic If Indicated (No Culture) - Urine, Clean Catch [[id number]]  (Abnormal) Collected: 07/05/21 1611    Specimen: Urine, Clean Catch Updated: 07/05/21 1634     Color, UA Yellow     Appearance, UA Clear     pH, UA 6.5     Specific Gravity, UA <=1.005     Glucose, UA Negative     Ketones, UA Negative     Bilirubin, UA Negative     Blood, UA Negative     Protein, UA Negative     Leuk Esterase, UA Small (1+)     Nitrite, UA Negative     Urobilinogen, UA 0.2 E.U./dL    Urinalysis, Microscopic Only - Urine, Clean Catch [[id number]]  (Abnormal) Collected: 07/05/21 1611    Specimen: Urine, Clean Catch Updated: 07/05/21 1634     RBC, UA 3-5 /HPF      WBC, UA 6-12 /HPF      Bacteria, UA None Seen /HPF      Squamous Epithelial Cells, UA 0-2 /HPF      Hyaline Casts, UA 0-2 /LPF      Methodology Automated Microscopy          Assessment/Plan       * No active hospital problems. *      Yajaira Doss is Day 1  post-partum  Vaginal, Spontaneous  midline 3rd degree  laceration - repaired .      Plan:  Continue current care.  Leaving ni catheter in until tomorrow.  Laceration and repair discussed.  Patient strongly encouraged to consider C/S for future deliveries.      Elodia Hyatt MD  2021  07:23 CDT    Electronically signed by Elodia Hyatt MD at 21     Elodia Hyatt MD at 21        Patient has been moved to room 6  AROM with clear fluid now    Electronically signed by Elodia Hyatt MD at 21       Edgar Ndiaye MD   Physician   OB Delivered   Discharge Summary      Signed   Date of Service:  21   Creation Time:  21            Signed             [facility]     Edgar Ndiaye MD  [address] [facility] [address] [place] [id number]  [phone]        Discharge Summary     [facility]  Yajaira Doss  :   1990  MRN:   [id number]  CSN:    [id number]     Date of Admission: 2021   Date of Discharge:  2021   Delivering Physician: Elodia Hyatt          Admission Diagnosis: 1. Pregnant and not yet delivered [Z34.90]   Discharge Diagnosis: 1. Pregnancy at 40w4d - delivered         Procedures: 2021  - Vaginal, Spontaneous        Hospital Course  Patient is a 30 y.o.  who at 40w4d had a vaginal birth.  Her postpartum course was without complications.  On PPD #2 she was ready for discharge.  She had normal lochia and pain well controlled with oral medications.     Infant  female  fetus weighing 3090 g (6 lb 13 oz)   Apgars -  8 @ 1 minute /  9 @ 5 minutes.     Discharge labs        Lab Results   Component Value Date     WBC 13.69 (H) 2021     HGB 10.3 (L) 2021     HCT 30.6 (L) 2021      2021         Discharge Medications           Discharge Medications            New Medications      Instructions Start Date   oxyCODONE-acetaminophen 5-325 MG per tablet  Commonly known as: PERCOCET    1 tablet, Oral, Every 6 Hours PRN                       Continue These Medications      Instructions Start Date   Colace 100 MG capsule  Generic drug: docusate sodium    100 mg, Oral, 2 Times Daily        ferrous sulfate 325 (65 FE) MG tablet    325 mg, Oral, Daily With Breakfast        PRENATAL VITAMIN PO    Oral, Daily                      External Prenatal Results              Pregnancy Outside Results - Transcribed From Office Records - See Scanned Records For Details      Test Value Date Time      ABO  AB  07/05/21 1806      Rh  Positive  07/05/21 1806      Antibody Screen  Negative  07/05/21 1806      Varicella IgG            Rubella ^ Immune  11/17/20        Hgb  10.3 g/dL 07/06/21 0338         11.7 g/dL 07/05/21 1806         10.7 g/dL 06/03/21 1127         9.7 g/dL 04/06/21 0821      Hct  30.6 % 07/06/21 0338         33.9 % 07/05/21 1806      Glucose Fasting GTT            Glucose Tolerance Test 1 hour            Glucose Tolerance Test 3 hour            Gonorrhea (discrete)  Negative  06/03/21 1247      Chlamydia (discrete)  Negative  06/03/21 1247      RPR ^ Non-Reactive  11/17/20        VDRL            Syphilis Antibody            HBsAg ^ Negative  11/17/20        Herpes Simplex Virus PCR ^ Negative 1&2  11/17/20        Herpes Simplex VIrus Culture            HIV ^ Non-Reactive  11/17/20        Hep C RNA Quant PCR            Hep C Antibody            AFP            Group B Strep  Negative  06/03/21 1247      GBS Susceptibility to Clindamycin            GBS Susceptibility to Erythromycin            Fetal Fibronectin            Genetic Testing, Maternal Blood                           Drug Screening              Test Value Date Time      Urine Drug Screen            Amphetamine Screen            Barbiturate Screen            Benzodiazepine Screen            Methadone Screen            Phencyclidine Screen            Opiates Screen            THC Screen            Cocaine Screen            Propoxyphene Screen            Buprenorphine Screen             Methamphetamine Screen            Oxycodone Screen            Tricyclic Antidepressants Screen                   Legend    ^: Historical                               Discharge Disposition Home or Self Care   Condition on Discharge: good   Follow-up: 1 week with Zelda Ndiaye MD  7/8/2021

## 2021-07-08 NOTE — PLAN OF CARE
Goal Outcome Evaluation:         Progress: improving  Outcome Summary: VSS, [id number] scant lochia, Voiding, stooling, ambulating, Bonding well with infant, SO at bedside attentive and helpful to patient.

## 2021-07-08 NOTE — DISCHARGE SUMMARY
[facility]    Edgar Ndiaye MD  [address] [facility] [address]  [place] [address]  [phone]      Discharge Summary     Purvi Doss  : 1990  MRN: [id number]  CSN: [id number]    Date of Admission: 2021   Date of Discharge:  2021   Delivering Physician: Elodia Hyatt        Admission Diagnosis: 1. Pregnant and not yet delivered [Z34.90]   Discharge Diagnosis: 1. Pregnancy at 40w4d - delivered       Procedures: 2021  - Vaginal, Spontaneous       Hospital Course  Patient is a 30 y.o.  who at 40w4d had a vaginal birth.  Her postpartum course was without complications.  On PPD #2 she was ready for discharge.  She had normal lochia and pain well controlled with oral medications.    Infant  female  fetus weighing 3090 g (6 lb 13 oz)   Apgars -  8 @ 1 minute /  9 @ 5 minutes.    Discharge labs  Lab Results   Component Value Date    WBC 13.69 (H) 2021    HGB 10.3 (L) 2021    HCT 30.6 (L) 2021     2021       Discharge Medications     Discharge Medications      New Medications      Instructions Start Date   oxyCODONE-acetaminophen 5-325 MG per tablet  Commonly known as: PERCOCET   1 tablet, Oral, Every 6 Hours PRN         Continue These Medications      Instructions Start Date   Colace 100 MG capsule  Generic drug: docusate sodium   100 mg, Oral, 2 Times Daily      ferrous sulfate 325 (65 FE) MG tablet   325 mg, Oral, Daily With Breakfast      PRENATAL VITAMIN PO   Oral, Daily             External Prenatal Results     Pregnancy Outside Results - Transcribed From Office Records - See Scanned Records For Details     Test Value Date Time    ABO  AB  21 180    Rh  Positive  21    Antibody Screen  Negative  21    Varicella IgG       Rubella ^ Immune  20     Hgb  10.3 g/dL 21 0338       11.7 g/dL 21 180       10.7 g/dL 21 1127       9.7 g/dL 21 0821    Hct  30.6 % 21  0338       33.9 % 07/05/21 1806    Glucose Fasting GTT       Glucose Tolerance Test 1 hour       Glucose Tolerance Test 3 hour       Gonorrhea (discrete)  Negative  06/03/21 1247    Chlamydia (discrete)  Negative  06/03/21 1247    RPR ^ Non-Reactive  11/17/20     VDRL       Syphilis Antibody       HBsAg ^ Negative  11/17/20     Herpes Simplex Virus PCR ^ Negative 1&2  11/17/20     Herpes Simplex VIrus Culture       HIV ^ Non-Reactive  11/17/20     Hep C RNA Quant PCR       Hep C Antibody       AFP       Group B Strep  Negative  06/03/21 1247    GBS Susceptibility to Clindamycin       GBS Susceptibility to Erythromycin       Fetal Fibronectin       Genetic Testing, Maternal Blood             Drug Screening     Test Value Date Time    Urine Drug Screen       Amphetamine Screen       Barbiturate Screen       Benzodiazepine Screen       Methadone Screen       Phencyclidine Screen       Opiates Screen       THC Screen       Cocaine Screen       Propoxyphene Screen       Buprenorphine Screen       Methamphetamine Screen       Oxycodone Screen       Tricyclic Antidepressants Screen             Legend    ^: Historical                        Discharge Disposition Home or Self Care   Condition on Discharge: good   Follow-up: 1 week with Zelda Ndiaye MD  7/8/2021

## 2021-07-08 NOTE — LACTATION NOTE
Mother's Name: Yajaira Doss  Phone #: [phone]  Infant Name: Farrukh     : 21  Gestation: 40w4d  Day of life: 3  Birth weight:  6-13 (3090g)   Discharge weight: 6-8.4 (2960g)  Weight Loss: -4.21%  24 hour Summary of Feeds: 2 BF + 10 Formula feeds (384 ml) Voids: 8 Stools: 11  Assistive devices (shields, shells, etc): None  Significant Maternal history: , Anemia, BF 1st child with difficulty  Maternal Concerns: None at this time  Maternal Goal: Breast and formula feed  Mother's Medications:  Colace, FE, PNV  Breastpump for home: Yes   Ped follow up appt: 2 weeks    Infant receiving formula primarily. Patient reports she plans to breast and formula feed at home. States she pumped this morning and collected 10 ml. However, she did not pump through the night. She reports nipple pain to be better. Questions/concerns denied at this time. Briefly reviewed discharge breastfeeding packet. Reiterated supply/demand and the need to pump to protect/build supply. Outpatient lactation support offered.

## 2021-07-13 LAB
CYTO UR: NORMAL
LAB AP CASE REPORT: NORMAL
LAB AP CLINICAL INFORMATION: NORMAL
PATH REPORT.FINAL DX SPEC: NORMAL
PATH REPORT.GROSS SPEC: NORMAL

## 2021-07-16 ENCOUNTER — HOSPITAL ENCOUNTER (OUTPATIENT)
Dept: LACTATION | Facility: HOSPITAL | Age: 31
Discharge: HOME | End: 2021-07-16

## 2021-07-16 NOTE — LACTATION NOTE
"Mother's Name:  Rambo Doss  G/P:    2/2  Breastfeeding Hx:  Formula and bfing with other child  Maternal Breast Assessment:  Bilateral fullness though not engorged, no nipple trauma    Infant's Name:  Zena Doss  Date of Birth:   [birth year]  Gestational age at Birth: 40w4d  Age:    10 days  Physician:   Dr. Alatorre                     Reason for Visit:  Transfer eval          Infant's Birth weight:  6-13 3090    Today's Weight:   6-11.9 3058 g   Wt Loss:  1%--virtually back to birth weight.     Feeding History Since Discharge/Last Lactation Appt.: Last feeding was 4 1/2 hour prior to consult . Mother is setting alarm every 3 hours to feed infant. However, 2 hours prior to appmt, she could not awaken Vanni to feed. Mother says she brings baby to the breast for every feed. Normally, Zena feeds every 1 1/2 hours. \"She is hungry all the time.\" If she cues within 30 mins of last bf, mother gives 60 mls formula. Mother pumps in AM, collecting 75 mls. That is the only pumping session she does.     Past 24 Hours Voids/Stools:    6+ / 4+    Color of Stool:  yellow           Left Breast: 22 mins 6-12.6 3078 g + 20 mls                Right Breast: 25 mins 6-14.5 3134 g + 54 mls    Total Minutes:   47          Total Weight Gain:   74   mls  Or 2 1/2 ounces EXCELLENT!!    Average Feeding Amount for Age: 60-90 mls    Interventions: Mother latches independently in cross cradle hold very well. Demonstrated Clement reflex stimulation with mother providing teach back. Infant gulping, swallowing milk vigorously for first 13 mins of feed. No pain with feed.     Education: pumping or bfing more often to increase supply, increasing supplements as needed with growth of infant, using \"baby sit ups\" waking technique    Notified MD/ Orders Received:    Feeding Plan:   Keep feeding every 3 hours or sooner if Vanni shows hunger signals.  Bfing more often, or pumping more often, will increase your milk supply and allow Vanni to get more milk " when she is at the breast.  Keep squeezing your breast while she feeds to help the milk flow to her.  If you are worried she is hungry, either bf her again, or give a bottle of your milk or formula; 30 mls or less.  If no bfing happens first, a full feeding would be 60-90 mls in a bottle.    Plan of Care:    Interventions accomplished satisfactorily, requires no further action.    Future Appointments:    Lactation: As desired by mother    Physician: Dr. Alatorre, Monday, 7/19/21    Signature: Meron Nunez IBCLC    Faxed to: Dr. Alatorre    Date:  7/16/21

## 2021-07-19 ENCOUNTER — POSTPARTUM VISIT (OUTPATIENT)
Dept: OBSTETRICS AND GYNECOLOGY | Facility: CLINIC | Age: 31
End: 2021-07-19

## 2021-07-19 VITALS
BODY MASS INDEX: 27.05 KG/M2 | HEIGHT: 62 IN | DIASTOLIC BLOOD PRESSURE: 68 MMHG | SYSTOLIC BLOOD PRESSURE: 98 MMHG | WEIGHT: 147 LBS

## 2021-07-19 PROCEDURE — 0503F POSTPARTUM CARE VISIT: CPT | Performed by: OBSTETRICS & GYNECOLOGY

## 2021-07-19 NOTE — PROGRESS NOTES
"Yajaira Doss is a 30 y.o. female here for short-term postpartum visit after sustaining 1/4 degree perineal laceration with her vaginal delivery on July 5.  She reports overall that she is doing well without significant pain, vaginal bleeding, or discharge.    Hemoglobin at discharge was 10.3    Visit Vitals  BP 98/68 (BP Location: Right arm, Patient Position: Sitting)   Ht 157.5 cm (62\")   Wt 66.7 kg (147 lb)   Breastfeeding Yes   BMI 26.89 kg/m²     Pleasant female no acute distress  Mood and affect normal  Breathing unlabored  Her perineum is intact without erythema or wound breakdown.  There is no drainage.    Assessment: Fourth degree obstetrical laceration    She is recovering quite well and her perineum is healing well.  She is welcome to do sitz baths as needed and I recommend that she continue a stool softener.  She will continue an iron supplement once a day for her mild anemia.  She will return in 4 weeks for a postpartum exam, but in the meantime if she has concerns she will contact the office.    "

## 2021-08-17 ENCOUNTER — POSTPARTUM VISIT (OUTPATIENT)
Dept: OBSTETRICS AND GYNECOLOGY | Facility: CLINIC | Age: 31
End: 2021-08-17

## 2021-08-17 VITALS
HEIGHT: 62 IN | SYSTOLIC BLOOD PRESSURE: 112 MMHG | BODY MASS INDEX: 26.68 KG/M2 | DIASTOLIC BLOOD PRESSURE: 82 MMHG | WEIGHT: 145 LBS

## 2021-08-17 PROCEDURE — 0503F POSTPARTUM CARE VISIT: CPT | Performed by: OBSTETRICS & GYNECOLOGY

## 2021-08-17 NOTE — PROGRESS NOTES
"Yajaira Doss is here for a postpartum visit after a vaginal delivery 6 weeks ago, complicated by a 4th degree laceration.  She has no signs of postpartum depression today.  She has not had a period since her delivery and is breast-feeding her infant without difficulty.  Her last Pap smear was 10/2020 and normal with negative HPV.  She has no history of cervical dysplasia.  She plans on using condoms for contraception.    /82 (BP Location: Left arm, Patient Position: Sitting)   Ht 157.5 cm (62\")   Wt 65.8 kg (145 lb)   Breastfeeding No   BMI 26.52 kg/m²    In general pleasant female no acute distress  Neck no thyromegaly  Breasts without erythema tenderness or masses  Abdomen soft and nontender  Perineum is well healed with slight tenderness  A Pap smear was not performed.     Assessment: Normal postpartum exam.    We have discussed current Pap smear screening guidelines. She will contact the office if she desires hormonal contraception. Yajaira will return in 1 year or sooner if needed.  "

## 2021-12-06 ENCOUNTER — OFFICE VISIT (OUTPATIENT)
Dept: OBSTETRICS AND GYNECOLOGY | Facility: CLINIC | Age: 31
End: 2021-12-06

## 2021-12-06 VITALS
WEIGHT: 142 LBS | SYSTOLIC BLOOD PRESSURE: 108 MMHG | BODY MASS INDEX: 26.13 KG/M2 | DIASTOLIC BLOOD PRESSURE: 82 MMHG | HEIGHT: 62 IN

## 2021-12-06 DIAGNOSIS — Z01.419 ENCOUNTER FOR GYNECOLOGICAL EXAMINATION WITHOUT ABNORMAL FINDING: Primary | ICD-10-CM

## 2021-12-06 PROCEDURE — 99395 PREV VISIT EST AGE 18-39: CPT | Performed by: OBSTETRICS & GYNECOLOGY

## 2021-12-06 NOTE — PROGRESS NOTES
"CC: annual exam    SUBJECTIVE: Yajaira Doss is a 31 y.o. female , para 2, who comes to the office today for annual GYN examination. Her last Pap smear was 10/2020, and was normal. She has no history of cervical dysplasia. She is not currently on contraception and is amenorrheic from breastfeeding. Her medical history is reviewed.     HPI      Social History     Tobacco Use   • Smoking status: Never Smoker   • Smokeless tobacco: Never Used   Substance Use Topics   • Alcohol use: No   • Drug use: No      Review of Systems   Constitutional: Negative for fever.   Respiratory: Negative for cough.    Genitourinary: Negative for menstrual problem.   Hematological: Does not bruise/bleed easily.       Visit Vitals  /82 (BP Location: Left arm, Patient Position: Sitting)   Ht 157.5 cm (62\")   Wt 64.4 kg (142 lb)   Breastfeeding Yes   BMI 25.97 kg/m²      Objective   Physical Exam  Vitals and nursing note reviewed. Exam conducted with a chaperone present.   Constitutional:       General: She is not in acute distress.     Appearance: She is well-developed.   HENT:      Head: Normocephalic and atraumatic.   Cardiovascular:      Rate and Rhythm: Normal rate and regular rhythm.      Heart sounds: No murmur heard.      Pulmonary:      Effort: Pulmonary effort is normal.      Breath sounds: Normal breath sounds.   Chest:   Breasts:      Right: No inverted nipple or mass.      Left: No inverted nipple or mass.       Abdominal:      General: There is no distension.      Palpations: Abdomen is soft.      Tenderness: There is no abdominal tenderness.   Genitourinary:     General: Normal vulva.      Exam position: Lithotomy position.      Labia:         Right: No tenderness or lesion.         Left: No tenderness or lesion.       Vagina: Normal. No vaginal discharge, tenderness or bleeding.      Cervix: No cervical motion tenderness, discharge or friability.      Uterus: Normal.       Adnexa:         Right: No tenderness or " fullness.          Left: No tenderness or fullness.     Musculoskeletal:         General: Normal range of motion.      Cervical back: Normal range of motion and neck supple.   Skin:     General: Skin is warm and dry.   Neurological:      Mental Status: She is alert and oriented to person, place, and time.   Psychiatric:         Behavior: Behavior normal.         Judgment: Judgment normal.       Assessment/Plan   Diagnoses and all orders for this visit:    1. Encounter for gynecological examination without abnormal finding (Primary)      She declines hormonal contraception at this time. We have discussed current Pap smear screening guidelines.  She will return in one year. In the meantime if she develops questions or problems, she will notify the office.

## 2022-12-29 ENCOUNTER — OFFICE VISIT (OUTPATIENT)
Dept: OBSTETRICS AND GYNECOLOGY | Facility: CLINIC | Age: 32
End: 2022-12-29

## 2022-12-29 VITALS
WEIGHT: 134 LBS | DIASTOLIC BLOOD PRESSURE: 76 MMHG | BODY MASS INDEX: 24.66 KG/M2 | SYSTOLIC BLOOD PRESSURE: 104 MMHG | HEIGHT: 62 IN

## 2022-12-29 DIAGNOSIS — Z12.4 ENCOUNTER FOR SCREENING FOR CERVICAL CANCER: ICD-10-CM

## 2022-12-29 DIAGNOSIS — Z01.419 WELL WOMAN EXAM WITH ROUTINE GYNECOLOGICAL EXAM: Primary | ICD-10-CM

## 2022-12-29 PROCEDURE — 87624 HPV HI-RISK TYP POOLED RSLT: CPT | Performed by: OBSTETRICS & GYNECOLOGY

## 2022-12-29 PROCEDURE — 99395 PREV VISIT EST AGE 18-39: CPT | Performed by: OBSTETRICS & GYNECOLOGY

## 2022-12-29 PROCEDURE — G0123 SCREEN CERV/VAG THIN LAYER: HCPCS | Performed by: OBSTETRICS & GYNECOLOGY

## 2022-12-29 NOTE — PROGRESS NOTES
"CC: annual exam    SUBJECTIVE: Yajaira Doss is a 32 y.o. female , para 2, who comes to the office today for annual GYN examination. Last menstrual period was 1 week ago and her last Pap smear was 10/2020, and was normal. She has no history of cervical dysplasia. She is currently not on contraception and is having regular cycles. Her medical history is reviewed.     HPI      Social History     Tobacco Use   • Smoking status: Never   • Smokeless tobacco: Never   Substance Use Topics   • Alcohol use: No   • Drug use: No        Review of Systems   Constitutional: Negative for fever.   Respiratory: Negative for cough.    Genitourinary: Negative for menstrual problem.   Hematological: Does not bruise/bleed easily.       Visit Vitals  /76 (BP Location: Left arm, Patient Position: Sitting, Cuff Size: Adult)   Ht 157.5 cm (62\")   Wt 60.8 kg (134 lb)   LMP 12/26/2022 (Exact Date)   Breastfeeding No   BMI 24.51 kg/m²      Objective   Physical Exam  Vitals and nursing note reviewed. Exam conducted with a chaperone present.   Constitutional:       General: She is not in acute distress.     Appearance: She is well-developed.   HENT:      Head: Normocephalic and atraumatic.   Cardiovascular:      Rate and Rhythm: Normal rate and regular rhythm.      Heart sounds: No murmur heard.  Pulmonary:      Effort: Pulmonary effort is normal.      Breath sounds: Normal breath sounds.   Chest:   Breasts:     Right: No inverted nipple or mass.      Left: No inverted nipple or mass.   Abdominal:      General: There is no distension.      Palpations: Abdomen is soft.      Tenderness: There is no abdominal tenderness.   Genitourinary:     General: Normal vulva.      Exam position: Lithotomy position.      Pubic Area: No rash.       Labia:         Right: No tenderness or lesion.         Left: No tenderness or lesion.       Vagina: Normal. No vaginal discharge, tenderness or bleeding.      Cervix: No cervical motion tenderness, discharge " or friability.      Uterus: Normal.       Adnexa:         Right: No tenderness or fullness.          Left: No tenderness or fullness.        Comments: Pap done  Musculoskeletal:         General: Normal range of motion.      Cervical back: Normal range of motion and neck supple.   Skin:     General: Skin is warm and dry.   Neurological:      Mental Status: She is alert and oriented to person, place, and time.   Psychiatric:         Behavior: Behavior normal.         Judgment: Judgment normal.       Assessment/Plan   Diagnoses and all orders for this visit:    1. Well woman exam with routine gynecological exam (Primary)    2. Encounter for screening for cervical cancer  -     Liquid-based Pap Smear, Screening        We will notify her when the Pap smear results are available. We have discussed current Pap smear screening guidelines.  She will return in one year. In the meantime if she develops questions or problems, she will notify the office.

## 2022-12-30 LAB
GEN CATEG CVX/VAG CYTO-IMP: NORMAL
HPV I/H RISK 4 DNA CVX QL PROBE+SIG AMP: NOT DETECTED
LAB AP CASE REPORT: NORMAL
LAB AP GYN ADDITIONAL INFORMATION: NORMAL
LAB AP GYN OTHER FINDINGS: NORMAL
Lab: NORMAL
PATH INTERP SPEC-IMP: NORMAL
STAT OF ADQ CVX/VAG CYTO-IMP: NORMAL

## 2023-02-11 ENCOUNTER — OFFICE VISIT (OUTPATIENT)
Age: 33
End: 2023-02-11

## 2023-02-11 VITALS
HEIGHT: 63 IN | BODY MASS INDEX: 23.92 KG/M2 | OXYGEN SATURATION: 100 % | HEART RATE: 65 BPM | DIASTOLIC BLOOD PRESSURE: 60 MMHG | TEMPERATURE: 97.3 F | WEIGHT: 135 LBS | SYSTOLIC BLOOD PRESSURE: 106 MMHG

## 2023-02-11 DIAGNOSIS — B37.9 CANDIDA ALBICANS INFECTION: ICD-10-CM

## 2023-02-11 DIAGNOSIS — N30.01 ACUTE CYSTITIS WITH HEMATURIA: Primary | ICD-10-CM

## 2023-02-11 DIAGNOSIS — R30.0 DYSURIA: ICD-10-CM

## 2023-02-11 LAB
APPEARANCE FLUID: CLEAR
BILIRUBIN, POC: ABNORMAL
BLOOD URINE, POC: ABNORMAL
CLARITY, POC: CLEAR
COLOR, POC: YELLOW
CONTROL: PRESENT
GLUCOSE URINE, POC: ABNORMAL
KETONES, POC: ABNORMAL
LEUKOCYTE EST, POC: ABNORMAL
NITRITE, POC: ABNORMAL
PH, POC: 6.5
PREGNANCY TEST URINE, POC: NEGATIVE
PROTEIN, POC: ABNORMAL
SPECIFIC GRAVITY, POC: 1.01
UROBILINOGEN, POC: 0.2

## 2023-02-11 RX ORDER — FLUCONAZOLE 150 MG/1
150 TABLET ORAL DAILY
Qty: 3 TABLET | Refills: 0 | Status: SHIPPED | OUTPATIENT
Start: 2023-02-11 | End: 2023-02-14

## 2023-02-11 RX ORDER — CEPHALEXIN 500 MG/1
500 CAPSULE ORAL 2 TIMES DAILY
Qty: 14 CAPSULE | Refills: 0 | Status: SHIPPED | OUTPATIENT
Start: 2023-02-11 | End: 2023-02-18

## 2023-02-11 RX ORDER — NITROFURANTOIN 25; 75 MG/1; MG/1
100 CAPSULE ORAL 2 TIMES DAILY
Qty: 20 CAPSULE | Refills: 0 | Status: SHIPPED | OUTPATIENT
Start: 2023-02-11 | End: 2023-02-11

## 2023-02-11 ASSESSMENT — ENCOUNTER SYMPTOMS
SHORTNESS OF BREATH: 0
VOMITING: 0
SINUS PAIN: 0
DIARRHEA: 0
SINUS PRESSURE: 0
ALLERGIC/IMMUNOLOGIC NEGATIVE: 1
NAUSEA: 0
COUGH: 0
ABDOMINAL PAIN: 0
SORE THROAT: 0
EYE PAIN: 0

## 2023-02-11 NOTE — PATIENT INSTRUCTIONS
Urine culture pending- will call when results are available. Antibiotic and Diflucan sent to the pharmacy. Tylenol as needed for pain. Increase fluid intake-specifically water over the next 2-3 days. Rest.  Urinate right after you have sex. After going to the bathroom, wipe from front to back. Avoid douches, bubble baths, and feminine hygiene sprays. And avoid other feminine hygiene products that have deodorants. Follow up with PCP or return to the clinic if symptoms worsen or fail to improve.

## 2023-02-11 NOTE — PROGRESS NOTES
[id number]  [address] [address] [place] [address]  Dept: [phone]  Dept Fax: [phone]  Loc: [phone]    Clara Millan is a 28 y.o. female who presents today for her medical conditions/complaints as noted below. Clara Millan is c/o of Urinary Frequency, Urinary Pain, Vaginal Discharge, and Vaginal Itching (Since Thursday )        HPI:     Clara Millan presents with complaints of burning upon urination, vaginal discharge and itching. Patient reports her and spouse are attempting to get pregnant. Denies any fever or chills. Symptoms began 3 days ago. Denies any OTC treatment. Denies recent antibiotics and steroids. Denies recent covid19 infection. Patient requests urine pregnancy test.    History reviewed. No pertinent past medical history. History reviewed. No pertinent surgical history. History reviewed. No pertinent family history. Social History     Tobacco Use    Smoking status: Never    Smokeless tobacco: Never   Substance Use Topics    Alcohol use: Not Currently      Current Outpatient Medications   Medication Sig Dispense Refill    fluconazole (DIFLUCAN) 150 MG tablet Take 1 tablet by mouth daily for 3 days 3 tablet 0    cephALEXin (KEFLEX) 500 MG capsule Take 1 capsule by mouth 2 times daily for 7 days 14 capsule 0    Prenatal Vit-Fe Fumarate-FA (PRENATAL VITAMINS PO) Take by mouth       No current facility-administered medications for this visit.      No Known Allergies    Health Maintenance   Topic Date Due    COVID-19 Vaccine (1) Never done    Varicella vaccine (1 of 2 - 2-dose childhood series) Never done    Depression Screen  Never done    HIV screen  Never done    Hepatitis C screen  Never done    DTaP/Tdap/Td vaccine (1 - Tdap) Never done    Flu vaccine (1) Never done    Cervical cancer screen  12/29/2025    Hepatitis A vaccine  Aged Out    Hib vaccine  Aged Out    Meningococcal (ACWY) vaccine  Aged Out Pneumococcal 0-64 years Vaccine  Aged Out       Subjective:     Review of Systems   Constitutional:  Negative for chills, fatigue and fever. HENT:  Negative for congestion, postnasal drip, sinus pressure, sinus pain and sore throat. Eyes:  Negative for pain and visual disturbance. Respiratory:  Negative for cough and shortness of breath. Cardiovascular:  Negative for chest pain. Gastrointestinal:  Negative for abdominal pain, diarrhea, nausea and vomiting. Endocrine: Negative for cold intolerance and heat intolerance. Genitourinary:  Positive for dysuria, frequency and vaginal discharge (thick, white). Negative for hematuria and urgency. Vaginal odor   Musculoskeletal:  Negative for myalgias. Skin:  Negative for rash. Allergic/Immunologic: Negative. Neurological:  Negative for weakness, light-headedness and headaches. Hematological: Negative. Psychiatric/Behavioral: Negative.       :Objective      Physical Exam  Constitutional:       Appearance: Normal appearance. HENT:      Head: Normocephalic and atraumatic. Right Ear: Tympanic membrane, ear canal and external ear normal.      Left Ear: Tympanic membrane, ear canal and external ear normal.      Nose: Nose normal.      Mouth/Throat:      Mouth: Mucous membranes are moist.   Eyes:      General:         Right eye: No discharge. Left eye: No discharge. Conjunctiva/sclera: Conjunctivae normal.   Cardiovascular:      Rate and Rhythm: Normal rate and regular rhythm. Pulmonary:      Effort: Pulmonary effort is normal. No respiratory distress. Abdominal:      General: Abdomen is flat. Bowel sounds are normal.      Palpations: Abdomen is soft. Tenderness: There is no abdominal tenderness. There is no right CVA tenderness or left CVA tenderness. Genitourinary:     Comments: Deferred exam  Musculoskeletal:         General: Normal range of motion. Cervical back: Normal range of motion.    Skin:     General: Skin is warm and dry. Capillary Refill: Capillary refill takes less than 2 seconds. Neurological:      General: No focal deficit present. Mental Status: She is alert. Psychiatric:         Mood and Affect: Mood normal.     /60   Pulse 65   Temp 97.3 °F (36.3 °C) (Temporal)   Ht 5' 3\" (1.6 m)   Wt 135 lb (61.2 kg)   LMP 01/21/2023   SpO2 100%   BMI 23.91 kg/m²     :Assessment       Diagnosis Orders   1. Acute cystitis with hematuria  cephALEXin (KEFLEX) 500 MG capsule    DISCONTINUED: nitrofurantoin, macrocrystal-monohydrate, (MACROBID) 100 MG capsule      2. Candida albicans infection  fluconazole (DIFLUCAN) 150 MG tablet      3. Dysuria  POCT Urinalysis no Micro    POCT urine pregnancy    Culture, Urine          :Plan   Urine culture pending- will call when results are available. Antibiotic and Diflucan sent to the pharmacy. Tylenol as needed for pain. Increase fluid intake-specifically water over the next 2-3 days. Rest.  Urinate right after you have sex. After going to the bathroom, wipe from front to back. Avoid douches, bubble baths, and feminine hygiene sprays. And avoid other feminine hygiene products that have deodorants. Return precautions and home care education completed. Patient verbalized understanding. Orders Placed This Encounter   Procedures    Culture, Urine     Order Specific Question:   Specify (ex-cath, midstream, cysto, etc)?      Answer:   midstream    POCT Urinalysis no Micro    POCT urine pregnancy       Results for orders placed or performed in visit on 02/11/23   POCT Urinalysis no Micro   Result Value Ref Range    Color, UA yellow     Clarity, UA clear     Glucose, UA POC neg     Bilirubin, UA neg     Ketones, UA neg     Spec Grav, UA 1.010     Blood, UA POC trace (A)     pH, UA 6.5     Protein, UA POC neg     Urobilinogen, UA 0.2     Leukocytes, UA large (A)     Nitrite, UA neg     Appearance, Fluid Clear Clear, Slightly Cloudy   POCT urine pregnancy Result Value Ref Range    Preg Test, Ur negative     Control present        Return if symptoms worsen or fail to improve. Orders Placed This Encounter   Medications    fluconazole (DIFLUCAN) 150 MG tablet     Sig: Take 1 tablet by mouth daily for 3 days     Dispense:  3 tablet     Refill:  0    DISCONTD: nitrofurantoin, macrocrystal-monohydrate, (MACROBID) 100 MG capsule     Sig: Take 1 capsule by mouth 2 times daily for 10 days     Dispense:  20 capsule     Refill:  0    cephALEXin (KEFLEX) 500 MG capsule     Sig: Take 1 capsule by mouth 2 times daily for 7 days     Dispense:  14 capsule     Refill:  0         Patient given educational materials- see patient instructions. Discussed use, benefit, and side effects of prescribed medications. All patient questions answered. Pt voiced understanding. Patient Instructions   Urine culture pending- will call when results are available. Antibiotic and Diflucan sent to the pharmacy. Tylenol as needed for pain. Increase fluid intake-specifically water over the next 2-3 days. Rest.  Urinate right after you have sex. After going to the bathroom, wipe from front to back. Avoid douches, bubble baths, and feminine hygiene sprays. And avoid other feminine hygiene products that have deodorants. Follow up with PCP or return to the clinic if symptoms worsen or fail to improve.       Electronically signed by OLIVE Maguire CNP on 2/11/2023 at 2:34 PM

## 2023-02-12 LAB — URINE CULTURE, ROUTINE: NO GROWTH

## 2023-03-21 ENCOUNTER — TELEPHONE (OUTPATIENT)
Dept: OBSTETRICS AND GYNECOLOGY | Facility: CLINIC | Age: 33
End: 2023-03-21

## 2023-03-21 NOTE — TELEPHONE ENCOUNTER
Caller: Yajaira Doss    Relationship to patient: Self    Best call back number: [phone]    Patient is needing: TO BE PRESCRIBED SOMETHING FOR HER NAUSEA. SCHEDULED FOR NEW OB ON 4.14 WITH HERMILA. LMP 01.20.23          
(2) assistive person

## 2023-04-06 ENCOUNTER — APPOINTMENT (RX ONLY)
Dept: URBAN - METROPOLITAN AREA CLINIC 27 | Facility: CLINIC | Age: 33
Setting detail: DERMATOLOGY
End: 2023-04-06

## 2023-04-06 DIAGNOSIS — L70.0 ACNE VULGARIS: ICD-10-CM | Status: INADEQUATELY CONTROLLED

## 2023-04-06 PROCEDURE — 99204 OFFICE O/P NEW MOD 45 MIN: CPT

## 2023-04-06 PROCEDURE — ? PRESCRIPTION

## 2023-04-06 PROCEDURE — ? ADDITIONAL NOTES

## 2023-04-06 PROCEDURE — ? COUNSELING

## 2023-04-06 PROCEDURE — ? PRESCRIPTION MEDICATION MANAGEMENT

## 2023-04-06 PROCEDURE — ? TREATMENT REGIMEN

## 2023-04-06 RX ORDER — SPIRONOLACTONE 50 MG/1
TABLET, FILM COATED ORAL
Qty: 60 | Refills: 3 | Status: ERX | COMMUNITY
Start: 2023-04-06

## 2023-04-06 RX ORDER — CLINDAMYCIN PHOSPHATE 10 MG/G
GEL TOPICAL
Qty: 60 | Refills: 2 | Status: ERX | COMMUNITY
Start: 2023-04-06

## 2023-04-06 RX ORDER — TRETINOIN 0.5 MG/G
CREAM TOPICAL
Qty: 45 | Refills: 2 | Status: ERX | COMMUNITY
Start: 2023-04-06

## 2023-04-06 RX ADMIN — CLINDAMYCIN PHOSPHATE: 10 GEL TOPICAL at 00:00

## 2023-04-06 RX ADMIN — SPIRONOLACTONE: 50 TABLET, FILM COATED ORAL at 00:00

## 2023-04-06 RX ADMIN — TRETINOIN: 0.5 CREAM TOPICAL at 00:00

## 2023-04-06 ASSESSMENT — LOCATION ZONE DERM: LOCATION ZONE: FACE

## 2023-04-06 ASSESSMENT — LOCATION SIMPLE DESCRIPTION DERM: LOCATION SIMPLE: LEFT CHEEK

## 2023-04-06 ASSESSMENT — LOCATION DETAILED DESCRIPTION DERM: LOCATION DETAILED: LEFT INFERIOR CENTRAL MALAR CHEEK

## 2023-04-06 NOTE — PROCEDURE: COUNSELING
Spironolactone Counseling: Patient advised regarding risks of diarrhea, abdominal pain, hyperkalemia, birth defects (for female patients), liver toxicity and renal toxicity. The patient may need blood work to monitor liver and kidney function and potassium levels while on therapy. The patient verbalized understanding of the proper use and possible adverse effects of spironolactone.  All of the patient's questions and concerns were addressed.
Bactrim Pregnancy And Lactation Text: This medication is Pregnancy Category D and is known to cause fetal risk.  It is also excreted in breast milk.
Dapsone Pregnancy And Lactation Text: This medication is Pregnancy Category C and is not considered safe during pregnancy or breast feeding.
Aklief counseling:  Patient advised to apply a pea-sized amount only at bedtime and wait 30 minutes after washing their face before applying.  If too drying, patient may add a non-comedogenic moisturizer.  The most commonly reported side effects including irritation, redness, scaling, dryness, stinging, burning, itching, and increased risk of sunburn.  The patient verbalized understanding of the proper use and possible adverse effects of retinoids.  All of the patient's questions and concerns were addressed.
Benzoyl Peroxide Pregnancy And Lactation Text: This medication is Pregnancy Category C. It is unknown if benzoyl peroxide is excreted in breast milk.
Isotretinoin Counseling: Patient should get monthly blood tests, not donate blood, not drive at night if vision affected, not share medication, and not undergo elective surgery for 6 months after tx completed. Side effects reviewed, pt to contact office should one occur.
Doxycycline Counseling:  Patient counseled regarding possible photosensitivity and increased risk for sunburn.  Patient instructed to avoid sunlight, if possible.  When exposed to sunlight, patients should wear protective clothing, sunglasses, and sunscreen.  The patient was instructed to call the office immediately if the following severe adverse effects occur:  hearing changes, easy bruising/bleeding, severe headache, or vision changes.  The patient verbalized understanding of the proper use and possible adverse effects of doxycycline.  All of the patient's questions and concerns were addressed.
Detail Level: Zone
Tazorac Pregnancy And Lactation Text: This medication is not safe during pregnancy. It is unknown if this medication is excreted in breast milk.
Minocycline Counseling: Patient advised regarding possible photosensitivity and discoloration of the teeth, skin, lips, tongue and gums.  Patient instructed to avoid sunlight, if possible.  When exposed to sunlight, patients should wear protective clothing, sunglasses, and sunscreen.  The patient was instructed to call the office immediately if the following severe adverse effects occur:  hearing changes, easy bruising/bleeding, severe headache, or vision changes.  The patient verbalized understanding of the proper use and possible adverse effects of minocycline.  All of the patient's questions and concerns were addressed.
Topical Sulfur Applications Pregnancy And Lactation Text: This medication is Pregnancy Category C and has an unknown safety profile during pregnancy. It is unknown if this topical medication is excreted in breast milk.
Birth Control Pills Counseling: Birth Control Pill Counseling: I discussed with the patient the potential side effects of OCPs including but not limited to increased risk of stroke, heart attack, thrombophlebitis, deep venous thrombosis, hepatic adenomas, breast changes, GI upset, headaches, and depression.  The patient verbalized understanding of the proper use and possible adverse effects of OCPs. All of the patient's questions and concerns were addressed.
Winlevi Counseling:  I discussed with the patient the risks of topical clascoterone including but not limited to erythema, scaling, itching, and stinging. Patient voiced their understanding.
Spironolactone Pregnancy And Lactation Text: This medication can cause feminization of the male fetus and should be avoided during pregnancy. The active metabolite is also found in breast milk.
Aklief Pregnancy And Lactation Text: It is unknown if this medication is safe to use during pregnancy.  It is unknown if this medication is excreted in breast milk.  Breastfeeding women should use the topical cream on the smallest area of the skin for the shortest time needed while breastfeeding.  Do not apply to nipple and areola.
Topical Retinoid counseling:  Patient advised to apply a pea-sized amount only at bedtime and wait 30 minutes after washing their face before applying.  If too drying, patient may add a non-comedogenic moisturizer. The patient verbalized understanding of the proper use and possible adverse effects of retinoids.  All of the patient's questions and concerns were addressed.
Include Pregnancy/Lactation Warning?: No
Isotretinoin Pregnancy And Lactation Text: This medication is Pregnancy Category X and is considered extremely dangerous during pregnancy. It is unknown if it is excreted in breast milk.
Topical Clindamycin Counseling: Patient counseled that this medication may cause skin irritation or allergic reactions.  In the event of skin irritation, the patient was advised to reduce the amount of the drug applied or use it less frequently.   The patient verbalized understanding of the proper use and possible adverse effects of clindamycin.  All of the patient's questions and concerns were addressed.
Minocycline Pregnancy And Lactation Text: This medication is Pregnancy Category D and not consider safe during pregnancy. It is also excreted in breast milk.
Azithromycin Counseling:  I discussed with the patient the risks of azithromycin including but not limited to GI upset, allergic reaction, drug rash, diarrhea, and yeast infections.
Azithromycin Pregnancy And Lactation Text: This medication is considered safe during pregnancy and is also secreted in breast milk.
Topical Clindamycin Pregnancy And Lactation Text: This medication is Pregnancy Category B and is considered safe during pregnancy. It is unknown if it is excreted in breast milk.
Winlevi Pregnancy And Lactation Text: This medication is considered safe during pregnancy and breastfeeding.
Sarecycline Counseling: Patient advised regarding possible photosensitivity and discoloration of the teeth, skin, lips, tongue and gums.  Patient instructed to avoid sunlight, if possible.  When exposed to sunlight, patients should wear protective clothing, sunglasses, and sunscreen.  The patient was instructed to call the office immediately if the following severe adverse effects occur:  hearing changes, easy bruising/bleeding, severe headache, or vision changes.  The patient verbalized understanding of the proper use and possible adverse effects of sarecycline.  All of the patient's questions and concerns were addressed.
Tetracycline Counseling: Patient counseled regarding possible photosensitivity and increased risk for sunburn.  Patient instructed to avoid sunlight, if possible.  When exposed to sunlight, patients should wear protective clothing, sunglasses, and sunscreen.  The patient was instructed to call the office immediately if the following severe adverse effects occur:  hearing changes, easy bruising/bleeding, severe headache, or vision changes.  The patient verbalized understanding of the proper use and possible adverse effects of tetracycline.  All of the patient's questions and concerns were addressed. Patient understands to avoid pregnancy while on therapy due to potential birth defects.
Birth Control Pills Pregnancy And Lactation Text: This medication should be avoided if pregnant and for the first 30 days post-partum.
Azelaic Acid Counseling: Patient counseled that medicine may cause skin irritation and to avoid applying near the eyes.  In the event of skin irritation, the patient was advised to reduce the amount of the drug applied or use it less frequently.   The patient verbalized understanding of the proper use and possible adverse effects of azelaic acid.  All of the patient's questions and concerns were addressed.
Doxycycline Pregnancy And Lactation Text: This medication is Pregnancy Category D and not consider safe during pregnancy. It is also excreted in breast milk but is considered safe for shorter treatment courses.
Topical Retinoid Pregnancy And Lactation Text: This medication is Pregnancy Category C. It is unknown if this medication is excreted in breast milk.
High Dose Vitamin A Counseling: Side effects reviewed, pt to contact office should one occur.
Bactrim Counseling:  I discussed with the patient the risks of sulfa antibiotics including but not limited to GI upset, allergic reaction, drug rash, diarrhea, dizziness, photosensitivity, and yeast infections.  Rarely, more serious reactions can occur including but not limited to aplastic anemia, agranulocytosis, methemoglobinemia, blood dyscrasias, liver or kidney failure, lung infiltrates or desquamative/blistering drug rashes.
Dapsone Counseling: I discussed with the patient the risks of dapsone including but not limited to hemolytic anemia, agranulocytosis, rashes, methemoglobinemia, kidney failure, peripheral neuropathy, headaches, GI upset, and liver toxicity.  Patients who start dapsone require monitoring including baseline LFTs and weekly CBCs for the first month, then every month thereafter.  The patient verbalized understanding of the proper use and possible adverse effects of dapsone.  All of the patient's questions and concerns were addressed.
Erythromycin Counseling:  I discussed with the patient the risks of erythromycin including but not limited to GI upset, allergic reaction, drug rash, diarrhea, increase in liver enzymes, and yeast infections.
Azelaic Acid Pregnancy And Lactation Text: This medication is considered safe during pregnancy and breast feeding.
Benzoyl Peroxide Counseling: Patient counseled that medicine may cause skin irritation and bleach clothing.  In the event of skin irritation, the patient was advised to reduce the amount of the drug applied or use it less frequently.   The patient verbalized understanding of the proper use and possible adverse effects of benzoyl peroxide.  All of the patient's questions and concerns were addressed.
Erythromycin Pregnancy And Lactation Text: This medication is Pregnancy Category B and is considered safe during pregnancy. It is also excreted in breast milk.
Tazorac Counseling:  Patient advised that medication is irritating and drying.  Patient may need to apply sparingly and wash off after an hour before eventually leaving it on overnight.  The patient verbalized understanding of the proper use and possible adverse effects of tazorac.  All of the patient's questions and concerns were addressed.
Topical Sulfur Applications Counseling: Topical Sulfur Counseling: Patient counseled that this medication may cause skin irritation or allergic reactions.  In the event of skin irritation, the patient was advised to reduce the amount of the drug applied or use it less frequently.   The patient verbalized understanding of the proper use and possible adverse effects of topical sulfur application.  All of the patient's questions and concerns were addressed.
High Dose Vitamin A Pregnancy And Lactation Text: High dose vitamin A therapy is contraindicated during pregnancy and breast feeding.

## 2023-04-06 NOTE — PROCEDURE: ADDITIONAL NOTES
Render Risk Assessment In Note?: yes
Detail Level: Simple
Additional Notes: Patient has implantable birth control device

## 2023-04-06 NOTE — PROCEDURE: PRESCRIPTION MEDICATION MANAGEMENT
Detail Level: Zone
Plan: Start spironolactone 50 mg tablet PO Sig: Take one tab PO QD and increase to two tabs QD as tolerated\\n\\nStart Retin-A 0.05 % topical cream TP Sig: Apply a pea-sized amount to entire face QHS\\n\\nCont. clindamycin 1 % topical gel TP Sig: Apply to face QAM - using from PCP for about a month with a little improvement\\n\\nCont. Minocycline 100mg PO QD for another 1-2 months - given from PCP 1 month ago. Patient notices a little improvement. She states she has refills and doesn't need rx at this time
Render In Strict Bullet Format?: No

## 2023-04-06 NOTE — HPI: PIMPLES (ACNE)
What Type Of Note Output Would You Prefer (Optional)?: Bullet Format
Is This A New Presentation, Or A Follow-Up?: Acne
Females Only: When Was Your Last Menstrual Period?: 03/172023

## 2023-04-14 ENCOUNTER — INITIAL PRENATAL (OUTPATIENT)
Dept: OBSTETRICS AND GYNECOLOGY | Facility: CLINIC | Age: 33
End: 2023-04-14
Payer: COMMERCIAL

## 2023-04-14 VITALS — BODY MASS INDEX: 24.69 KG/M2 | WEIGHT: 135 LBS | DIASTOLIC BLOOD PRESSURE: 70 MMHG | SYSTOLIC BLOOD PRESSURE: 112 MMHG

## 2023-04-14 DIAGNOSIS — Z34.91 PRENATAL CARE IN FIRST TRIMESTER: Primary | ICD-10-CM

## 2023-04-14 PROCEDURE — 0501F PRENATAL FLOW SHEET: CPT | Performed by: OBSTETRICS & GYNECOLOGY

## 2023-04-14 NOTE — PROGRESS NOTES
at 12 weeks initial prenatal care visit. No obstetrical complaints. She reports that the nausea has greatly improved.   ObHx: NSVDX2, extensive third degree laceration   GynHx: last pap smear was  and ZULMA; denies STIs   PMH: denies   PSH: denies   NKDA   PNV   Sh: denies drinking,smoking or drug use   PE see above   A/P  at 12 weeks initial prenatal care   RTC In 4 weeks   Miscarriage precautions   Discussed primary c section due to risk of reoccurence of obstetrical lacerations and subsequent fecal incontinence  New ob labs   New ob info

## 2023-04-24 LAB
ABO GROUP BLD: NORMAL
BACTERIA UR CULT: NO GROWTH
BACTERIA UR CULT: NORMAL
BASOPHILS # BLD AUTO: 0.04 10*3/MM3 (ref 0–0.2)
BASOPHILS NFR BLD AUTO: 0.5 % (ref 0–1.5)
BLD GP AB SCN SERPL QL: NEGATIVE
C TRACH RRNA SPEC QL NAA+PROBE: NEGATIVE
DRUGS UR: NORMAL
EOSINOPHIL # BLD AUTO: 0.16 10*3/MM3 (ref 0–0.4)
EOSINOPHIL NFR BLD AUTO: 1.8 % (ref 0.3–6.2)
ERYTHROCYTE [DISTWIDTH] IN BLOOD BY AUTOMATED COUNT: 11.8 % (ref 12.3–15.4)
HBV SURFACE AG SERPL QL IA: NEGATIVE
HCT VFR BLD AUTO: 34.7 % (ref 34–46.6)
HCV IGG SERPL QL IA: NON REACTIVE
HGB BLD-MCNC: 11.7 G/DL (ref 12–15.9)
HIV 1+2 AB+HIV1 P24 AG SERPL QL IA: NON REACTIVE
IMM GRANULOCYTES # BLD AUTO: 0.02 10*3/MM3 (ref 0–0.05)
IMM GRANULOCYTES NFR BLD AUTO: 0.2 % (ref 0–0.5)
LYMPHOCYTES # BLD AUTO: 2.74 10*3/MM3 (ref 0.7–3.1)
LYMPHOCYTES NFR BLD AUTO: 31.6 % (ref 19.6–45.3)
MCH RBC QN AUTO: 29.9 PG (ref 26.6–33)
MCHC RBC AUTO-ENTMCNC: 33.7 G/DL (ref 31.5–35.7)
MCV RBC AUTO: 88.7 FL (ref 79–97)
MONOCYTES # BLD AUTO: 0.45 10*3/MM3 (ref 0.1–0.9)
MONOCYTES NFR BLD AUTO: 5.2 % (ref 5–12)
N GONORRHOEA RRNA SPEC QL NAA+PROBE: NEGATIVE
NEUTROPHILS # BLD AUTO: 5.27 10*3/MM3 (ref 1.7–7)
NEUTROPHILS NFR BLD AUTO: 60.7 % (ref 42.7–76)
NRBC BLD AUTO-RTO: 0 /100 WBC (ref 0–0.2)
PLATELET # BLD AUTO: 228 10*3/MM3 (ref 140–450)
RBC # BLD AUTO: 3.91 10*6/MM3 (ref 3.77–5.28)
RH BLD: POSITIVE
RPR SER QL: NON REACTIVE
RUBV IGG SERPL IA-ACNC: 4.26 INDEX
WBC # BLD AUTO: 8.68 10*3/MM3 (ref 3.4–10.8)

## 2023-04-26 DIAGNOSIS — Z34.91 PRENATAL CARE IN FIRST TRIMESTER: Primary | ICD-10-CM

## 2023-04-26 RX ORDER — FERROUS SULFATE 325(65) MG
325 TABLET ORAL
Qty: 90 TABLET | Refills: 1 | Status: SHIPPED | OUTPATIENT
Start: 2023-04-26

## 2023-05-03 ENCOUNTER — TELEPHONE (OUTPATIENT)
Dept: OBSTETRICS AND GYNECOLOGY | Facility: CLINIC | Age: 33
End: 2023-05-03

## 2023-05-03 NOTE — TELEPHONE ENCOUNTER
Caller: DAVID BERGERON    Relationship: SELF    Best call back number: [phone]     What is the best time to reach you: ANYTIME - LVM    Who are you requesting to speak with (clinical staff, provider,  specific staff member): OFFICE STAFF    PT IS GOING [place] OF [place] COUN[place] AND WILL BE SEEING A DOCTOR OVER SEAS - PT IS NEEDING TO PAY THE BALANCE OWED TO THE OFFICE BEFORE SHE LEAVES ON 05/11/23 - PLEASE CALL THE PT TO DISCUSS - PT WOULD LIKE A PAPER STATEMENT - PT DOESN'T HAVE WALLET ON HAND TO PAY OVER THE PHONE TODAY 05/03/23    THANK YOU!

## 2023-05-11 ENCOUNTER — APPOINTMENT (RX ONLY)
Dept: URBAN - METROPOLITAN AREA CLINIC 27 | Facility: CLINIC | Age: 33
Setting detail: DERMATOLOGY
End: 2023-05-11

## 2023-05-11 DIAGNOSIS — L70.0 ACNE VULGARIS: ICD-10-CM | Status: IMPROVED

## 2023-05-11 PROCEDURE — ? ADDITIONAL NOTES

## 2023-05-11 PROCEDURE — ? TREATMENT REGIMEN

## 2023-05-11 PROCEDURE — ? COUNSELING

## 2023-05-11 PROCEDURE — ? PRESCRIPTION MEDICATION MANAGEMENT

## 2023-05-11 PROCEDURE — 99213 OFFICE O/P EST LOW 20 MIN: CPT

## 2023-05-11 ASSESSMENT — LOCATION SIMPLE DESCRIPTION DERM: LOCATION SIMPLE: LEFT CHEEK

## 2023-05-11 ASSESSMENT — LOCATION ZONE DERM: LOCATION ZONE: FACE

## 2023-05-11 ASSESSMENT — LOCATION DETAILED DESCRIPTION DERM: LOCATION DETAILED: LEFT INFERIOR CENTRAL MALAR CHEEK

## 2023-08-03 ENCOUNTER — APPOINTMENT (RX ONLY)
Dept: URBAN - METROPOLITAN AREA CLINIC 27 | Facility: CLINIC | Age: 33
Setting detail: DERMATOLOGY
End: 2023-08-03

## 2023-08-03 DIAGNOSIS — L70.0 ACNE VULGARIS: ICD-10-CM | Status: IMPROVED

## 2023-08-03 PROCEDURE — ? TREATMENT REGIMEN

## 2023-08-03 PROCEDURE — ? ADDITIONAL NOTES

## 2023-08-03 PROCEDURE — 99213 OFFICE O/P EST LOW 20 MIN: CPT

## 2023-08-03 PROCEDURE — ? COUNSELING

## 2023-08-03 PROCEDURE — ? PRESCRIPTION MEDICATION MANAGEMENT

## 2023-08-03 ASSESSMENT — LOCATION SIMPLE DESCRIPTION DERM: LOCATION SIMPLE: LEFT CHEEK

## 2023-08-03 ASSESSMENT — LOCATION ZONE DERM: LOCATION ZONE: FACE

## 2023-08-03 ASSESSMENT — LOCATION DETAILED DESCRIPTION DERM: LOCATION DETAILED: LEFT INFERIOR CENTRAL MALAR CHEEK

## 2023-08-03 NOTE — PROCEDURE: PRESCRIPTION MEDICATION MANAGEMENT
Modify Regimen: Use Azalaic Acid OTC
Detail Level: Zone
Plan: Continue spironolactone 50 mg tablet PO Sig: Take two tabs PO QD \\n\\nContinue Retin-A 0.05 % topical cream TP Sig: Apply a pea-sized amount to entire face QHS
Render In Strict Bullet Format?: No
Discontinue Regimen: clindamycin 1 % topical gel TP Sig: Apply to face QAM -

## 2023-12-28 ENCOUNTER — OFFICE VISIT (OUTPATIENT)
Dept: INTERNAL MEDICINE | Age: 33
End: 2023-12-28
Payer: COMMERCIAL

## 2023-12-28 VITALS
TEMPERATURE: 97.2 F | OXYGEN SATURATION: 98 % | WEIGHT: 134 LBS | SYSTOLIC BLOOD PRESSURE: 98 MMHG | BODY MASS INDEX: 23.74 KG/M2 | DIASTOLIC BLOOD PRESSURE: 68 MMHG | HEART RATE: 79 BPM

## 2023-12-28 DIAGNOSIS — Z00.00 ENCOUNTER FOR WELL ADULT EXAM WITHOUT ABNORMAL FINDINGS: ICD-10-CM

## 2023-12-28 DIAGNOSIS — Z76.89 ENCOUNTER TO ESTABLISH CARE: Primary | ICD-10-CM

## 2023-12-28 DIAGNOSIS — Z76.89 ENCOUNTER TO ESTABLISH CARE: ICD-10-CM

## 2023-12-28 LAB
ALBUMIN SERPL-MCNC: 4.2 G/DL (ref 3.5–5.2)
ALP SERPL-CCNC: 55 U/L (ref 35–104)
ALT SERPL-CCNC: 10 U/L (ref 5–33)
ANION GAP SERPL CALCULATED.3IONS-SCNC: 9 MMOL/L (ref 7–19)
AST SERPL-CCNC: 14 U/L (ref 5–32)
BASOPHILS # BLD: 0.1 K/UL (ref 0–0.2)
BASOPHILS NFR BLD: 0.7 % (ref 0–1)
BILIRUB SERPL-MCNC: 0.4 MG/DL (ref 0.2–1.2)
BUN SERPL-MCNC: 9 MG/DL (ref 6–20)
CALCIUM SERPL-MCNC: 9.4 MG/DL (ref 8.6–10)
CHLORIDE SERPL-SCNC: 102 MMOL/L (ref 98–111)
CHOLEST SERPL-MCNC: 140 MG/DL (ref 160–199)
CO2 SERPL-SCNC: 28 MMOL/L (ref 22–29)
CREAT SERPL-MCNC: 0.6 MG/DL (ref 0.5–0.9)
EOSINOPHIL # BLD: 0.2 K/UL (ref 0–0.6)
EOSINOPHIL NFR BLD: 2.4 % (ref 0–5)
ERYTHROCYTE [DISTWIDTH] IN BLOOD BY AUTOMATED COUNT: 12.3 % (ref 11.5–14.5)
GLUCOSE SERPL-MCNC: 98 MG/DL (ref 74–109)
HCT VFR BLD AUTO: 40.1 % (ref 37–47)
HDLC SERPL-MCNC: 64 MG/DL (ref 65–121)
HGB BLD-MCNC: 13.2 G/DL (ref 12–16)
IMM GRANULOCYTES # BLD: 0 K/UL
LDLC SERPL CALC-MCNC: 64 MG/DL
LYMPHOCYTES # BLD: 3.4 K/UL (ref 1.1–4.5)
LYMPHOCYTES NFR BLD: 44.9 % (ref 20–40)
MCH RBC QN AUTO: 30.6 PG (ref 27–31)
MCHC RBC AUTO-ENTMCNC: 32.9 G/DL (ref 33–37)
MCV RBC AUTO: 93 FL (ref 81–99)
MONOCYTES # BLD: 0.5 K/UL (ref 0–0.9)
MONOCYTES NFR BLD: 7 % (ref 0–10)
NEUTROPHILS # BLD: 3.4 K/UL (ref 1.5–7.5)
NEUTS SEG NFR BLD: 44.7 % (ref 50–65)
PLATELET # BLD AUTO: 252 K/UL (ref 130–400)
PMV BLD AUTO: 11.4 FL (ref 9.4–12.3)
POTASSIUM SERPL-SCNC: 3.9 MMOL/L (ref 3.5–5)
PROT SERPL-MCNC: 6.4 G/DL (ref 6.6–8.7)
RBC # BLD AUTO: 4.31 M/UL (ref 4.2–5.4)
SODIUM SERPL-SCNC: 139 MMOL/L (ref 136–145)
TRIGL SERPL-MCNC: 61 MG/DL (ref 0–149)
WBC # BLD AUTO: 7.5 K/UL (ref 4.8–10.8)

## 2023-12-28 PROCEDURE — 99385 PREV VISIT NEW AGE 18-39: CPT | Performed by: NURSE PRACTITIONER

## 2023-12-28 NOTE — PROGRESS NOTES
[facility] INTERNAL MEDICINE  [address]  [place] [address]  Dept: [phone]  Dept Fax: [phone]  Loc: [phone]    Yan Ring is a 35 y.o. female who presents today for her medical conditions/complaintsas noted below. Yan Ring is c/o of [facility] and Annual Exam        HPI:     HPI  Stella Mcdonough presents today to [facility]. She reports she is in good health. Has no complaints today. Her GYN is Dr. Fabiola Harper. Does report some lower back pain/stiffness. This is worse during the week and better on the weekend. Does use back support in her chair. No past medical history on file. No past surgical history on file. No family history on file. Social History     Tobacco Use    Smoking status: Never    Smokeless tobacco: Never   Substance Use Topics    Alcohol use: Not Currently      No current outpatient medications on file. No current facility-administered medications for this visit. No Known Allergies    Health Maintenance   Topic Date Due    Hepatitis B vaccine (1 of 3 - 3-dose series) Never done    Varicella vaccine (1 of 2 - 2-dose childhood series) Never done    Depression Screen  Never done    HIV screen  Never done    Hepatitis C screen  Never done    Cervical cancer screen  Never done    Flu vaccine (1) 08/01/2023    COVID-19 Vaccine (2 - 2023-24 season) 09/01/2023    DTaP/Tdap/Td vaccine (2 - Td or Tdap) 04/23/2031    Hepatitis A vaccine  Aged Out    Hib vaccine  Aged Out    HPV vaccine  Aged Out    Polio vaccine  Aged Out    Meningococcal (ACWY) vaccine  Aged Out    Pneumococcal 0-64 years Vaccine  Aged Out       Subjective:     Review of Systems   Musculoskeletal:  Positive for back pain. All other systems reviewed and are negative.      :Objective      Physical Exam  Vitals and nursing note reviewed. Constitutional:       General: She is not in acute distress. Appearance: Normal appearance.  She is

## 2024-02-09 ENCOUNTER — APPOINTMENT (RX ONLY)
Dept: URBAN - METROPOLITAN AREA CLINIC 27 | Facility: CLINIC | Age: 34
Setting detail: DERMATOLOGY
End: 2024-02-09

## 2024-02-09 DIAGNOSIS — L70.0 ACNE VULGARIS: ICD-10-CM | Status: INADEQUATELY CONTROLLED

## 2024-02-09 DIAGNOSIS — L81.4 OTHER MELANIN HYPERPIGMENTATION: ICD-10-CM | Status: INADEQUATELY CONTROLLED

## 2024-02-09 PROCEDURE — ? ADDITIONAL NOTES

## 2024-02-09 PROCEDURE — ? COUNSELING

## 2024-02-09 PROCEDURE — ? PRESCRIPTION

## 2024-02-09 PROCEDURE — 99214 OFFICE O/P EST MOD 30 MIN: CPT

## 2024-02-09 PROCEDURE — ? PRESCRIPTION MEDICATION MANAGEMENT

## 2024-02-09 PROCEDURE — ? TREATMENT REGIMEN

## 2024-02-09 PROCEDURE — ? PHOTO-DOCUMENTATION

## 2024-02-09 RX ORDER — HYDROQUINONE 40 MG/G
CREAM TOPICAL BID
Qty: 28.4 | Refills: 2 | Status: ERX | COMMUNITY
Start: 2024-02-09

## 2024-02-09 RX ORDER — SPIRONOLACTONE 50 MG/1
TABLET, FILM COATED ORAL
Qty: 60 | Refills: 5 | Status: ERX

## 2024-02-09 RX ORDER — TRETINOIN 0.25 MG/G
CREAM TOPICAL
Qty: 45 | Refills: 4 | Status: ERX | COMMUNITY
Start: 2024-02-09

## 2024-02-09 RX ADMIN — TRETINOIN: 0.25 CREAM TOPICAL at 00:00

## 2024-02-09 RX ADMIN — HYDROQUINONE: 40 CREAM TOPICAL at 00:00

## 2024-02-09 ASSESSMENT — LOCATION DETAILED DESCRIPTION DERM
LOCATION DETAILED: LEFT INFERIOR MEDIAL MALAR CHEEK
LOCATION DETAILED: RIGHT INFERIOR MEDIAL MALAR CHEEK
LOCATION DETAILED: LEFT INFERIOR CENTRAL MALAR CHEEK

## 2024-02-09 ASSESSMENT — LOCATION SIMPLE DESCRIPTION DERM
LOCATION SIMPLE: RIGHT CHEEK
LOCATION SIMPLE: LEFT CHEEK

## 2024-02-09 ASSESSMENT — LOCATION ZONE DERM: LOCATION ZONE: FACE

## 2024-02-09 NOTE — PROCEDURE: PRESCRIPTION MEDICATION MANAGEMENT
Continue Regimen: restart spironolactone 50 mg tablet PO Sig: Take two tabs PO QD \\nrestart Retin-A 0.05 % topical cream TP Sig: Apply a pea-sized amount to entire face QHS
Detail Level: Zone
Render In Strict Bullet Format?: No
Initiate Treatment: hydroquinone 4 % topical cream: Apply a thin layer to AA on dark spots only BID for up to 3 months then take 3 month break

## 2024-02-09 NOTE — PROCEDURE: COUNSELING
Spironolactone Counseling: Patient advised regarding risks of diarrhea, abdominal pain, hyperkalemia, birth defects (for female patients), liver toxicity and renal toxicity. The patient may need blood work to monitor liver and kidney function and potassium levels while on therapy. The patient verbalized understanding of the proper use and possible adverse effects of spironolactone.  All of the patient's questions and concerns were addressed.
Bactrim Pregnancy And Lactation Text: This medication is Pregnancy Category D and is known to cause fetal risk.  It is also excreted in breast milk.
Dapsone Pregnancy And Lactation Text: This medication is Pregnancy Category C and is not considered safe during pregnancy or breast feeding.
Aklief counseling:  Patient advised to apply a pea-sized amount only at bedtime and wait 30 minutes after washing their face before applying.  If too drying, patient may add a non-comedogenic moisturizer.  The most commonly reported side effects including irritation, redness, scaling, dryness, stinging, burning, itching, and increased risk of sunburn.  The patient verbalized understanding of the proper use and possible adverse effects of retinoids.  All of the patient's questions and concerns were addressed.
Benzoyl Peroxide Pregnancy And Lactation Text: This medication is Pregnancy Category C. It is unknown if benzoyl peroxide is excreted in breast milk.
Isotretinoin Counseling: Patient should get monthly blood tests, not donate blood, not drive at night if vision affected, not share medication, and not undergo elective surgery for 6 months after tx completed. Side effects reviewed, pt to contact office should one occur.
Doxycycline Counseling:  Patient counseled regarding possible photosensitivity and increased risk for sunburn.  Patient instructed to avoid sunlight, if possible.  When exposed to sunlight, patients should wear protective clothing, sunglasses, and sunscreen.  The patient was instructed to call the office immediately if the following severe adverse effects occur:  hearing changes, easy bruising/bleeding, severe headache, or vision changes.  The patient verbalized understanding of the proper use and possible adverse effects of doxycycline.  All of the patient's questions and concerns were addressed.
Detail Level: Zone
Tazorac Pregnancy And Lactation Text: This medication is not safe during pregnancy. It is unknown if this medication is excreted in breast milk.
Minocycline Counseling: Patient advised regarding possible photosensitivity and discoloration of the teeth, skin, lips, tongue and gums.  Patient instructed to avoid sunlight, if possible.  When exposed to sunlight, patients should wear protective clothing, sunglasses, and sunscreen.  The patient was instructed to call the office immediately if the following severe adverse effects occur:  hearing changes, easy bruising/bleeding, severe headache, or vision changes.  The patient verbalized understanding of the proper use and possible adverse effects of minocycline.  All of the patient's questions and concerns were addressed.
Topical Sulfur Applications Pregnancy And Lactation Text: This medication is Pregnancy Category C and has an unknown safety profile during pregnancy. It is unknown if this topical medication is excreted in breast milk.
Birth Control Pills Counseling: Birth Control Pill Counseling: I discussed with the patient the potential side effects of OCPs including but not limited to increased risk of stroke, heart attack, thrombophlebitis, deep venous thrombosis, hepatic adenomas, breast changes, GI upset, headaches, and depression.  The patient verbalized understanding of the proper use and possible adverse effects of OCPs. All of the patient's questions and concerns were addressed.
Winlevi Counseling:  I discussed with the patient the risks of topical clascoterone including but not limited to erythema, scaling, itching, and stinging. Patient voiced their understanding.
Spironolactone Pregnancy And Lactation Text: This medication can cause feminization of the male fetus and should be avoided during pregnancy. The active metabolite is also found in breast milk.
Aklief Pregnancy And Lactation Text: It is unknown if this medication is safe to use during pregnancy.  It is unknown if this medication is excreted in breast milk.  Breastfeeding women should use the topical cream on the smallest area of the skin for the shortest time needed while breastfeeding.  Do not apply to nipple and areola.
Topical Retinoid counseling:  Patient advised to apply a pea-sized amount only at bedtime and wait 30 minutes after washing their face before applying.  If too drying, patient may add a non-comedogenic moisturizer. The patient verbalized understanding of the proper use and possible adverse effects of retinoids.  All of the patient's questions and concerns were addressed.
Include Pregnancy/Lactation Warning?: No
Isotretinoin Pregnancy And Lactation Text: This medication is Pregnancy Category X and is considered extremely dangerous during pregnancy. It is unknown if it is excreted in breast milk.
Topical Clindamycin Counseling: Patient counseled that this medication may cause skin irritation or allergic reactions.  In the event of skin irritation, the patient was advised to reduce the amount of the drug applied or use it less frequently.   The patient verbalized understanding of the proper use and possible adverse effects of clindamycin.  All of the patient's questions and concerns were addressed.
Minocycline Pregnancy And Lactation Text: This medication is Pregnancy Category D and not consider safe during pregnancy. It is also excreted in breast milk.
Azithromycin Counseling:  I discussed with the patient the risks of azithromycin including but not limited to GI upset, allergic reaction, drug rash, diarrhea, and yeast infections.
Azithromycin Pregnancy And Lactation Text: This medication is considered safe during pregnancy and is also secreted in breast milk.
Topical Clindamycin Pregnancy And Lactation Text: This medication is Pregnancy Category B and is considered safe during pregnancy. It is unknown if it is excreted in breast milk.
Winlevi Pregnancy And Lactation Text: This medication is considered safe during pregnancy and breastfeeding.
Sarecycline Counseling: Patient advised regarding possible photosensitivity and discoloration of the teeth, skin, lips, tongue and gums.  Patient instructed to avoid sunlight, if possible.  When exposed to sunlight, patients should wear protective clothing, sunglasses, and sunscreen.  The patient was instructed to call the office immediately if the following severe adverse effects occur:  hearing changes, easy bruising/bleeding, severe headache, or vision changes.  The patient verbalized understanding of the proper use and possible adverse effects of sarecycline.  All of the patient's questions and concerns were addressed.
Tetracycline Counseling: Patient counseled regarding possible photosensitivity and increased risk for sunburn.  Patient instructed to avoid sunlight, if possible.  When exposed to sunlight, patients should wear protective clothing, sunglasses, and sunscreen.  The patient was instructed to call the office immediately if the following severe adverse effects occur:  hearing changes, easy bruising/bleeding, severe headache, or vision changes.  The patient verbalized understanding of the proper use and possible adverse effects of tetracycline.  All of the patient's questions and concerns were addressed. Patient understands to avoid pregnancy while on therapy due to potential birth defects.
Birth Control Pills Pregnancy And Lactation Text: This medication should be avoided if pregnant and for the first 30 days post-partum.
Azelaic Acid Counseling: Patient counseled that medicine may cause skin irritation and to avoid applying near the eyes.  In the event of skin irritation, the patient was advised to reduce the amount of the drug applied or use it less frequently.   The patient verbalized understanding of the proper use and possible adverse effects of azelaic acid.  All of the patient's questions and concerns were addressed.
Doxycycline Pregnancy And Lactation Text: This medication is Pregnancy Category D and not consider safe during pregnancy. It is also excreted in breast milk but is considered safe for shorter treatment courses.
Topical Retinoid Pregnancy And Lactation Text: This medication is Pregnancy Category C. It is unknown if this medication is excreted in breast milk.
High Dose Vitamin A Counseling: Side effects reviewed, pt to contact office should one occur.
Bactrim Counseling:  I discussed with the patient the risks of sulfa antibiotics including but not limited to GI upset, allergic reaction, drug rash, diarrhea, dizziness, photosensitivity, and yeast infections.  Rarely, more serious reactions can occur including but not limited to aplastic anemia, agranulocytosis, methemoglobinemia, blood dyscrasias, liver or kidney failure, lung infiltrates or desquamative/blistering drug rashes.
Dapsone Counseling: I discussed with the patient the risks of dapsone including but not limited to hemolytic anemia, agranulocytosis, rashes, methemoglobinemia, kidney failure, peripheral neuropathy, headaches, GI upset, and liver toxicity.  Patients who start dapsone require monitoring including baseline LFTs and weekly CBCs for the first month, then every month thereafter.  The patient verbalized understanding of the proper use and possible adverse effects of dapsone.  All of the patient's questions and concerns were addressed.
Erythromycin Counseling:  I discussed with the patient the risks of erythromycin including but not limited to GI upset, allergic reaction, drug rash, diarrhea, increase in liver enzymes, and yeast infections.
Azelaic Acid Pregnancy And Lactation Text: This medication is considered safe during pregnancy and breast feeding.
Benzoyl Peroxide Counseling: Patient counseled that medicine may cause skin irritation and bleach clothing.  In the event of skin irritation, the patient was advised to reduce the amount of the drug applied or use it less frequently.   The patient verbalized understanding of the proper use and possible adverse effects of benzoyl peroxide.  All of the patient's questions and concerns were addressed.
Erythromycin Pregnancy And Lactation Text: This medication is Pregnancy Category B and is considered safe during pregnancy. It is also excreted in breast milk.
Tazorac Counseling:  Patient advised that medication is irritating and drying.  Patient may need to apply sparingly and wash off after an hour before eventually leaving it on overnight.  The patient verbalized understanding of the proper use and possible adverse effects of tazorac.  All of the patient's questions and concerns were addressed.
Topical Sulfur Applications Counseling: Topical Sulfur Counseling: Patient counseled that this medication may cause skin irritation or allergic reactions.  In the event of skin irritation, the patient was advised to reduce the amount of the drug applied or use it less frequently.   The patient verbalized understanding of the proper use and possible adverse effects of topical sulfur application.  All of the patient's questions and concerns were addressed.
High Dose Vitamin A Pregnancy And Lactation Text: High dose vitamin A therapy is contraindicated during pregnancy and breast feeding.
Detail Level: Simple

## 2024-11-06 ENCOUNTER — OFFICE VISIT (OUTPATIENT)
Dept: INTERNAL MEDICINE | Age: 34
End: 2024-11-06
Payer: COMMERCIAL

## 2024-11-06 VITALS
SYSTOLIC BLOOD PRESSURE: 100 MMHG | WEIGHT: 134 LBS | BODY MASS INDEX: 23.74 KG/M2 | HEIGHT: 63 IN | DIASTOLIC BLOOD PRESSURE: 70 MMHG | HEART RATE: 64 BPM | OXYGEN SATURATION: 100 % | TEMPERATURE: 97.7 F

## 2024-11-06 DIAGNOSIS — Z00.00 PHYSICAL EXAM: ICD-10-CM

## 2024-11-06 DIAGNOSIS — Z00.00 PHYSICAL EXAM: Primary | ICD-10-CM

## 2024-11-06 LAB
ALBUMIN SERPL-MCNC: 4.3 G/DL (ref 3.5–5.2)
ALP SERPL-CCNC: 61 U/L (ref 35–104)
ALT SERPL-CCNC: 12 U/L (ref 5–33)
ANION GAP SERPL CALCULATED.3IONS-SCNC: 10 MMOL/L (ref 7–19)
AST SERPL-CCNC: 16 U/L (ref 5–32)
BASOPHILS # BLD: 0.1 K/UL (ref 0–0.2)
BASOPHILS NFR BLD: 0.5 % (ref 0–1)
BILIRUB SERPL-MCNC: 0.3 MG/DL (ref 0.2–1.2)
BUN SERPL-MCNC: 8 MG/DL (ref 6–20)
CALCIUM SERPL-MCNC: 9.5 MG/DL (ref 8.6–10)
CHLORIDE SERPL-SCNC: 101 MMOL/L (ref 98–111)
CHOLEST SERPL-MCNC: 158 MG/DL (ref 0–199)
CO2 SERPL-SCNC: 27 MMOL/L (ref 22–29)
CREAT SERPL-MCNC: 0.5 MG/DL (ref 0.5–0.9)
EOSINOPHIL # BLD: 0.3 K/UL (ref 0–0.6)
EOSINOPHIL NFR BLD: 2.7 % (ref 0–5)
ERYTHROCYTE [DISTWIDTH] IN BLOOD BY AUTOMATED COUNT: 12.7 % (ref 11.5–14.5)
GLUCOSE SERPL-MCNC: 83 MG/DL (ref 70–99)
HCT VFR BLD AUTO: 38.5 % (ref 37–47)
HDLC SERPL-MCNC: 69 MG/DL (ref 40–60)
HGB BLD-MCNC: 12.3 G/DL (ref 12–16)
IMM GRANULOCYTES # BLD: 0 K/UL
LDLC SERPL CALC-MCNC: 77 MG/DL
LYMPHOCYTES # BLD: 4 K/UL (ref 1.1–4.5)
LYMPHOCYTES NFR BLD: 43.4 % (ref 20–40)
MCH RBC QN AUTO: 29.9 PG (ref 27–31)
MCHC RBC AUTO-ENTMCNC: 31.9 G/DL (ref 33–37)
MCV RBC AUTO: 93.7 FL (ref 81–99)
MONOCYTES # BLD: 0.6 K/UL (ref 0–0.9)
MONOCYTES NFR BLD: 6.7 % (ref 0–10)
NEUTROPHILS # BLD: 4.2 K/UL (ref 1.5–7.5)
NEUTS SEG NFR BLD: 46.5 % (ref 50–65)
PLATELET # BLD AUTO: 284 K/UL (ref 130–400)
PMV BLD AUTO: 11.8 FL (ref 9.4–12.3)
POTASSIUM SERPL-SCNC: 4.3 MMOL/L (ref 3.5–5)
PROT SERPL-MCNC: 6.7 G/DL (ref 6.4–8.3)
RBC # BLD AUTO: 4.11 M/UL (ref 4.2–5.4)
SODIUM SERPL-SCNC: 138 MMOL/L (ref 136–145)
TRIGL SERPL-MCNC: 62 MG/DL (ref 0–149)
WBC # BLD AUTO: 9.1 K/UL (ref 4.8–10.8)

## 2024-11-06 PROCEDURE — 99395 PREV VISIT EST AGE 18-39: CPT | Performed by: NURSE PRACTITIONER

## 2024-11-06 ASSESSMENT — ENCOUNTER SYMPTOMS
GASTROINTESTINAL NEGATIVE: 1
ALLERGIC/IMMUNOLOGIC NEGATIVE: 1
EYES NEGATIVE: 1
RESPIRATORY NEGATIVE: 1

## 2024-11-06 ASSESSMENT — PATIENT HEALTH QUESTIONNAIRE - PHQ9
SUM OF ALL RESPONSES TO PHQ9 QUESTIONS 1 & 2: 0
SUM OF ALL RESPONSES TO PHQ QUESTIONS 1-9: 0
2. FEELING DOWN, DEPRESSED OR HOPELESS: NOT AT ALL
SUM OF ALL RESPONSES TO PHQ QUESTIONS 1-9: 0
SUM OF ALL RESPONSES TO PHQ QUESTIONS 1-9: 0
1. LITTLE INTEREST OR PLEASURE IN DOING THINGS: NOT AT ALL
SUM OF ALL RESPONSES TO PHQ QUESTIONS 1-9: 0

## 2024-11-06 NOTE — PROGRESS NOTES
Differential; Future  -     Lipid, Fasting; Future      Return in about 1 year (around 11/6/2025) for Physical.     Labs today and will call with results  Follow up in one year    PDMP Monitoring:    Last PDMP [place] as Reviewed:  Review User Review Instant Review Result            Urine Drug Screenings (1 yr)    No resulted procedures found.       Medication Contract and Consent for Opioid Use Documents Filed        No documents found                     Patient given educational materials -see patient instructions.  Discussed use, benefit, and side effects of prescribed medications.  All patient questions answered.  Pt voiced understanding. Reviewed health maintenance.  Instructed to continue currentmedications, diet and exercise.  Patient agreed with treatment plan. Follow up as directed.   MEDICATIONS:  No orders of the defined types were placed in this encounter.        ORDERS:  Orders Placed This Encounter   Procedures    Comprehensive Metabolic Panel    CBC with Auto Differential    Lipid, Fasting       Follow-up:  Return in about 1 year (around 11/6/2025) for Physical.    PATIENT INSTRUCTIONS:  There are no Patient Instructions on file for this visit.  Electronically signed by OLIVE Suresh on 11/6/2024 at 10:37 PM    EMR Dragon/transcription disclaimer:  Much of thisencounter note is electronic transcription/translation of spoken language to printed texts.  The electronic translation of spoken language may be erroneous, or at times, nonsensical words or phrases may be inadvertentlytranscribed.  Although I have reviewed the note for such errors, some may still exist.

## 2025-04-22 ENCOUNTER — OFFICE VISIT (OUTPATIENT)
Age: 35
End: 2025-04-22
Payer: COMMERCIAL

## 2025-04-22 VITALS
DIASTOLIC BLOOD PRESSURE: 66 MMHG | SYSTOLIC BLOOD PRESSURE: 100 MMHG | HEIGHT: 62 IN | WEIGHT: 126 LBS | BODY MASS INDEX: 23.19 KG/M2

## 2025-04-22 DIAGNOSIS — Z00.00 ENCOUNTER FOR ANNUAL PHYSICAL EXAMINATION EXCLUDING GYNECOLOGICAL EXAMINATION IN A PATIENT OLDER THAN 17 YEARS: Primary | ICD-10-CM

## 2025-04-22 DIAGNOSIS — Z12.4 ENCOUNTER FOR SCREENING FOR CERVICAL CANCER: ICD-10-CM

## 2025-04-22 DIAGNOSIS — N92.6 IRREGULAR MENSES: ICD-10-CM

## 2025-04-22 PROCEDURE — 87624 HPV HI-RISK TYP POOLED RSLT: CPT | Performed by: OBSTETRICS & GYNECOLOGY

## 2025-04-22 PROCEDURE — G0123 SCREEN CERV/VAG THIN LAYER: HCPCS | Performed by: OBSTETRICS & GYNECOLOGY

## 2025-04-22 NOTE — PROGRESS NOTES
"Wilda Doss is a 34 y.o. female presents for annual exam. Pt with last pap smear 2022 WNL.  Pt doing well. Menses are regular however reports a very light flow.  Is concerned about change in menses.  uses condoms.  Pt is otherwise healthy. Denies vaginal discharge, dyspareunia, pelvic pain. Denies breast changes.     Gynecologic Exam          /66   Ht 157.5 cm (62.01\")   Wt 57.2 kg (126 lb)   LMP 04/14/2025 (Exact Date)   Breastfeeding No   BMI 23.04 kg/m²     Outpatient Encounter Medications as of 4/22/2025   Medication Sig Dispense Refill    multivitamin with minerals (MULTIVITAMIN ADULT PO) Take 1 tablet by mouth Daily.      [DISCONTINUED] ferrous sulfate 325 (65 FE) MG tablet Take 1 tablet by mouth Daily With Breakfast. 90 tablet 1    [DISCONTINUED] Prenatal Vit-Fe Fumarate-FA (PRENATAL VITAMIN PO) Take  by mouth Daily.       No facility-administered encounter medications on file as of 4/22/2025.       Surgical History  History reviewed. No pertinent surgical history.    Family History  Family History   Problem Relation Age of Onset    Heart attack Father     Breast cancer Neg Hx     Ovarian cancer Neg Hx     Uterine cancer Neg Hx     Colon cancer Neg Hx     Melanoma Neg Hx     Prostate cancer Neg Hx        The following portions of the patient's history were reviewed and updated as appropriate: allergies, current medications, past family history, past medical history, past social history, past surgical history, and problem list.    Review of Systems    Objective   Physical Exam  Exam conducted with a chaperone present.   Constitutional:       Appearance: Normal appearance.   Cardiovascular:      Rate and Rhythm: Normal rate and regular rhythm.      Pulses: Normal pulses.   Pulmonary:      Effort: Pulmonary effort is normal.      Breath sounds: Normal breath sounds.   Chest:   Breasts:     Right: Normal. No inverted nipple, mass, nipple discharge or skin change.      Left: " Normal. No inverted nipple, mass, nipple discharge or skin change.   Abdominal:      General: Abdomen is flat. Bowel sounds are normal.      Palpations: Abdomen is soft.   Genitourinary:     Comments: Normal external genitalia, vaginal mucosa pink without lesions, cervix smooth without lesions, no prolapse, bimanual exam with anteverted uterus, normal size, no masses, no CMT, nontender, no vaginal discharge  Musculoskeletal:      Cervical back: Normal range of motion and neck supple.   Neurological:      Mental Status: She is alert.   Psychiatric:         Mood and Affect: Mood normal.         Behavior: Behavior normal.         Assessment & Plan   Diagnoses and all orders for this visit:    1. Encounter for annual physical examination excluding gynecological examination in a patient older than 17 years (Primary)  35 y/o  here for annual exam. Pap smear today. Normal breast and pelvic exam today. RTC yearly. Daily MV encouraged.   2. Irregular menses  -     HCG, B-subunit, Quantitative  -     TSH Rfx On Abnormal To Free T4  -     Prolactin  -     Follicle Stimulating Hormone  -     CBC (No Diff)  Irregularity in menses for the last few months. Labs today. I suspect they will be normal, however pt is concerned about the lightness of her flow. Will call with results.   3. Encounter for screening for cervical cancer  -     Liquid-based Pap Smear, Screening       BMI Body mass index is 23.04 kg/m²..   Colonoscopy: at age 45  Mammogram: at age 40  DEXA:  at age 65  Pap smear, per ASCCP guidelines, Done today  STI screening: declines  Contraception: declines    AVS/Patient education handout on the following as well w/discussion  Encouraged self breast awareness.  Encouraged proactive weight management and importance of maintaining a healthy weight.   Encouraged regular exercise and the importance of same, in regards to a healthy heart as well as helping to maintain her weight and improving her mental health.       BMI  is within normal parameters. No other follow-up for BMI required.      Ting Manuel MD  4/22/2025

## 2025-04-23 LAB
ERYTHROCYTE [DISTWIDTH] IN BLOOD BY AUTOMATED COUNT: 12 % (ref 11.7–15.4)
FSH SERPL-ACNC: 4.7 MIU/ML
HCG INTACT+B SERPL-ACNC: <1 MIU/ML
HCT VFR BLD AUTO: 37.6 % (ref 34–46.6)
HGB BLD-MCNC: 12.1 G/DL (ref 11.1–15.9)
MCH RBC QN AUTO: 29.5 PG (ref 26.6–33)
MCHC RBC AUTO-ENTMCNC: 32.2 G/DL (ref 31.5–35.7)
MCV RBC AUTO: 92 FL (ref 79–97)
PLATELET # BLD AUTO: 293 X10E3/UL (ref 150–450)
PROLACTIN SERPL-MCNC: 18.2 NG/ML (ref 4.8–33.4)
RBC # BLD AUTO: 4.1 X10E6/UL (ref 3.77–5.28)
TSH SERPL DL<=0.005 MIU/L-ACNC: 1.66 UIU/ML (ref 0.45–4.5)
WBC # BLD AUTO: 8.3 X10E3/UL (ref 3.4–10.8)

## 2025-04-24 LAB
GEN CATEG CVX/VAG CYTO-IMP: NORMAL
HPV I/H RISK 4 DNA CVX QL PROBE+SIG AMP: NOT DETECTED
LAB AP CASE REPORT: NORMAL
LAB AP GYN ADDITIONAL INFORMATION: NORMAL
Lab: NORMAL
PATH INTERP SPEC-IMP: NORMAL
STAT OF ADQ CVX/VAG CYTO-IMP: NORMAL

## (undated) DEVICE — SUT VICRYL 3/0 CT1 27IN J258H